# Patient Record
Sex: FEMALE | Race: BLACK OR AFRICAN AMERICAN
[De-identification: names, ages, dates, MRNs, and addresses within clinical notes are randomized per-mention and may not be internally consistent; named-entity substitution may affect disease eponyms.]

---

## 2023-02-13 PROBLEM — F32.A DEPRESSION: Status: ACTIVE | Noted: 2023-02-13

## 2023-02-13 PROBLEM — E66.9 CLASS 2 OBESITY WITH BODY MASS INDEX (BMI) OF 38.0 TO 38.9 IN ADULT: Status: ACTIVE | Noted: 2023-02-13

## 2023-02-13 PROBLEM — R61 NIGHT SWEATS: Status: ACTIVE | Noted: 2023-02-13

## 2023-02-13 PROBLEM — J45.909 ASTHMA (HHS-HCC): Status: ACTIVE | Noted: 2023-02-13

## 2023-02-13 PROBLEM — E55.9 VITAMIN D DEFICIENCY: Status: ACTIVE | Noted: 2023-02-13

## 2023-02-13 PROBLEM — M79.89 LEG SWELLING: Status: ACTIVE | Noted: 2023-02-13

## 2023-02-13 PROBLEM — R51.9 FACE PAIN: Status: ACTIVE | Noted: 2023-02-13

## 2023-02-13 PROBLEM — R51.9 HEADACHE: Status: ACTIVE | Noted: 2023-02-13

## 2023-02-13 PROBLEM — T75.3XXA MOTION SICKNESS: Status: ACTIVE | Noted: 2023-02-13

## 2023-02-13 PROBLEM — R60.9 LIPOEDEMA: Status: ACTIVE | Noted: 2023-02-13

## 2023-02-13 PROBLEM — G50.0 LEFT-SIDED TRIGEMINAL NEURALGIA: Status: ACTIVE | Noted: 2023-02-13

## 2023-02-13 PROBLEM — Z04.9 CONDITION NOT FOUND: Status: ACTIVE | Noted: 2023-02-13

## 2023-02-13 PROBLEM — I10 BENIGN HYPERTENSION: Status: ACTIVE | Noted: 2023-02-13

## 2023-02-13 PROBLEM — E88.810 DYSMETABOLIC SYNDROME: Status: ACTIVE | Noted: 2023-02-13

## 2023-02-13 PROBLEM — E66.812 CLASS 2 OBESITY WITH BODY MASS INDEX (BMI) OF 38.0 TO 38.9 IN ADULT: Status: ACTIVE | Noted: 2023-02-13

## 2023-02-13 PROBLEM — R06.83 SNORING: Status: ACTIVE | Noted: 2023-02-13

## 2023-02-13 PROBLEM — E78.5 LIPIDEMIA: Status: ACTIVE | Noted: 2023-02-13

## 2023-02-13 PROBLEM — G47.30 SLEEP APNEA, UNSPECIFIED: Status: ACTIVE | Noted: 2023-02-13

## 2023-02-13 RX ORDER — VIT C/E/ZN/COPPR/LUTEIN/ZEAXAN 250MG-90MG
CAPSULE ORAL DAILY
COMMUNITY

## 2023-02-13 RX ORDER — ALBUTEROL SULFATE 0.83 MG/ML
SOLUTION RESPIRATORY (INHALATION)
COMMUNITY

## 2023-02-13 RX ORDER — CLOBETASOL PROPIONATE 0.5 MG/G
1 OINTMENT TOPICAL EVERY OTHER DAY
COMMUNITY

## 2023-02-13 RX ORDER — AMLODIPINE BESYLATE 5 MG/1
1 TABLET ORAL DAILY
Qty: 30 TABLET | Refills: 14 | COMMUNITY
End: 2023-09-01

## 2023-02-13 RX ORDER — CLINDAMYCIN PHOSPHATE 10 UG/ML
1 LOTION TOPICAL DAILY
COMMUNITY
End: 2023-09-01

## 2023-02-13 RX ORDER — ALBUTEROL SULFATE 90 UG/1
1 AEROSOL, METERED RESPIRATORY (INHALATION) EVERY 4 HOURS PRN
COMMUNITY

## 2023-02-13 RX ORDER — BENZOYL PEROXIDE 100 MG/ML
LIQUID TOPICAL DAILY PRN
COMMUNITY

## 2023-02-13 RX ORDER — LOSARTAN POTASSIUM 50 MG/1
50 TABLET ORAL
COMMUNITY
End: 2023-04-13

## 2023-04-13 DIAGNOSIS — I10 PRIMARY HYPERTENSION: Primary | ICD-10-CM

## 2023-04-13 RX ORDER — LOSARTAN POTASSIUM 50 MG/1
50 TABLET ORAL DAILY
Qty: 30 TABLET | Refills: 0 | Status: SHIPPED | OUTPATIENT
Start: 2023-04-13 | End: 2023-09-01 | Stop reason: SDUPTHER

## 2023-07-09 ENCOUNTER — NURSE TRIAGE (OUTPATIENT)
Dept: OTHER | Facility: CLINIC | Age: 58
End: 2023-07-09

## 2023-07-31 ENCOUNTER — TELEPHONE (OUTPATIENT)
Dept: PRIMARY CARE | Facility: CLINIC | Age: 58
End: 2023-07-31

## 2023-07-31 NOTE — TELEPHONE ENCOUNTER
Patient stated she has had a cough and inflammation of the chest for a month and she is concerned she has an appointment in November but wants to be seen sooner.

## 2023-09-01 ENCOUNTER — OFFICE VISIT (OUTPATIENT)
Dept: PRIMARY CARE | Facility: CLINIC | Age: 58
End: 2023-09-01
Payer: COMMERCIAL

## 2023-09-01 VITALS
OXYGEN SATURATION: 93 % | BODY MASS INDEX: 41.7 KG/M2 | HEART RATE: 77 BPM | SYSTOLIC BLOOD PRESSURE: 156 MMHG | WEIGHT: 250.6 LBS | RESPIRATION RATE: 16 BRPM | DIASTOLIC BLOOD PRESSURE: 85 MMHG

## 2023-09-01 DIAGNOSIS — Z13.1 SCREENING FOR DIABETES MELLITUS: ICD-10-CM

## 2023-09-01 DIAGNOSIS — J40 BRONCHITIS: ICD-10-CM

## 2023-09-01 DIAGNOSIS — G47.30 SLEEP APNEA, UNSPECIFIED TYPE: ICD-10-CM

## 2023-09-01 DIAGNOSIS — N28.9 FUNCTION KIDNEY DECREASED: ICD-10-CM

## 2023-09-01 DIAGNOSIS — I10 PRIMARY HYPERTENSION: ICD-10-CM

## 2023-09-01 DIAGNOSIS — E66.01 SEVERE OBESITY (BMI >= 40) (MULTI): ICD-10-CM

## 2023-09-01 DIAGNOSIS — I10 BENIGN HYPERTENSION: Primary | ICD-10-CM

## 2023-09-01 LAB
ESTIMATED AVERAGE GLUCOSE FOR HBA1C: 120 MG/DL
HEMOGLOBIN A1C/HEMOGLOBIN TOTAL IN BLOOD: 5.8 %

## 2023-09-01 PROCEDURE — 3079F DIAST BP 80-89 MM HG: CPT | Performed by: SPECIALIST

## 2023-09-01 PROCEDURE — 99214 OFFICE O/P EST MOD 30 MIN: CPT | Performed by: SPECIALIST

## 2023-09-01 PROCEDURE — 3077F SYST BP >= 140 MM HG: CPT | Performed by: SPECIALIST

## 2023-09-01 PROCEDURE — 1036F TOBACCO NON-USER: CPT | Performed by: SPECIALIST

## 2023-09-01 PROCEDURE — 83036 HEMOGLOBIN GLYCOSYLATED A1C: CPT

## 2023-09-01 PROCEDURE — 3008F BODY MASS INDEX DOCD: CPT | Performed by: SPECIALIST

## 2023-09-01 RX ORDER — LOSARTAN POTASSIUM 50 MG/1
50 TABLET ORAL DAILY
Qty: 90 TABLET | Refills: 1 | Status: SHIPPED | OUTPATIENT
Start: 2023-09-01 | End: 2024-04-17 | Stop reason: SDUPTHER

## 2023-09-01 RX ORDER — AMLODIPINE BESYLATE 5 MG/1
5 TABLET ORAL DAILY
Qty: 90 TABLET | Refills: 1 | Status: SHIPPED | OUTPATIENT
Start: 2023-09-01 | End: 2023-11-09

## 2023-09-01 RX ORDER — LOSARTAN POTASSIUM 50 MG/1
50 TABLET ORAL DAILY
Qty: 90 TABLET | Refills: 1 | Status: SHIPPED | OUTPATIENT
Start: 2023-09-01 | End: 2023-09-01

## 2023-09-01 NOTE — PROGRESS NOTES
Subjective   Patient ID: Sheela Davis is a 57 y.o. female who presents for Cough (Persistent. Went to ER the other day and they diagnosed her with bronchitis.), Medication Question (Patient wants to see if she possibly can get a Rx for Ozempic, Trulicity and Mounjaro.), and Hypertension.  HPI    56 yo female retired  Pmhx Asthma, Hypertension, Lipoedema, Hiradenitis Supparativa Obesity BMI 37 presents for follow-up     Late June, visited aunt, got sick end of June got better, but kept getting sick  Had been sick for so long, had been letharigic and laying around but is starting to feel better  Went to ER 8/30 and is on doxycycline    Weight is creeping up   Previously saw weight management doctor, said she needed Ozempic but was not covered and told her to find out from insurnace company which one would be covered and she said she was told Ozempic Trulicity and Munjaro are covered.  Said provider then ordered Trulicity because patient was told it would be covered but then needed prior auth (sent message to her prescriber to see if prior auth was done).  Discussed with patient that most insurance companies will only cover those medications if there is a diagnosis of diabetes.  She wants to get HBA1C tested.    By the time she did all of that she couldn't get another appointment with Weight Management.      Has been taking prednisone, she prescribed it for herself.  She used an antibiotic ointment and had an allergic reaction so had hives all over and had prednisone at home from dermatology for the Hiradenitis Suppurativa, has been taking for 7 days and took 40 mg x 4 now down to 20 mg x 2 and will take 1 for 2 days     Allergies   Allergen Reactions    Clindamycin Hives      Current Outpatient Medications   Medication Instructions    albuterol (ProAir HFA) 90 mcg/actuation inhaler 1 puff, inhalation, Every 4 hours PRN    albuterol 2.5 mg /3 mL (0.083 %) nebulizer solution inhalation, Give x 1    amLODIPine  (NORVASC) 5 mg, oral, Daily    benzoyl peroxide (Benzac AC) 10 % external wash Topical, Daily PRN    cholecalciferol (Vitamin D-3) 25 MCG (1000 UT) capsule oral, Daily    clobetasol (Temovate) 0.05 % ointment 1 Application, Topical, Every other day, Apply to scalp.    diclofenac sodium 1 % kit 1 Application, transdermal, 4 times daily, Apply 4 GM as needed. Do not apply more than 16 GM daily to any one affected joint    losartan (COZAAR) 50 mg, oral, Daily        Review of Systems  Constitutional  Some fatigue, no fevers, no chills, no unintentional weight loss,   HEENT:  No headaches, no dizziness, no blurred vision  Cardiovascular:  No chest pain, no palpitations now   Respiratory:  Positive cough x month and wheezing, Nocturnal Cough but improving with antibiotic No shortness of breath at rest  GI:  No nausea, no vomiting, no diarrhea  :  No burning with urination some increased frequency but drinking more    Physical Exam  /85   Pulse 77   Resp 16   Wt 114 kg (250 lb 9.6 oz)   SpO2 93%   BMI 41.70 kg/m²   152/68   General:    Well-appearing  F in no acute distress, well nourished, well hydrated  Head:  Normocephalic, atraumatic  Skin:          Warm dry,   Eyes:  Anicteric sclera, pupils equal,   Oral:      Not examined due to pandemic  Neck:   Supple  Cor:      Regular rate, normal S1, S2, no murmurs appreciated, no S3, no S4   Lungs:   Clear to auscultation b/l, exp wheezes, no rhonchi, no crackles, no accessory respiratory muscle use    Assessment/Plan   Problem List Items Addressed This Visit       Sleep apnea, unspecified     On Cpap         Severe obesity (BMI >= 40) (CMS/[facility])     Previous evaluation with [facility], was prescribed Trulicity but said she needs prior auth  Is frustrated and wants to lose weight  She believes Ozempic or Trulicity should be covered for her because of Htn, LISA  Discussed, most insurance does not cover these medications for weight loss  medications and only cover if diabetes  Labs ordered HBA1C, she will follow-up with her insurance carrier to clarify coverage           Bronchitis     On doxycycline from ER x 2 days (to complete 10 day course)  She is also taking prednisone that she had at home          Benign hypertension - Primary     Suboptimally controlled, but has been off of losartan x 2 weeks  Will re-order losartan 50 mg daily wants 90 day supply  Currently taking 5 mg amlodipine          Relevant Orders    Basic metabolic panel    Function kidney decreased     Gfr in ER was 55, cr 1.14  Has not been on losartan x 2 weeks  Labs ordered BMP to do in few weeks after resuming losartan         Relevant Orders    Basic metabolic panel    Screening for diabetes mellitus     Labs ordered hba1c         Relevant Orders    Hemoglobin A1C (Completed)       Needs follow-up blood pressure in 2-4 weeks       Nina Clifton, DO

## 2023-09-05 PROBLEM — Z13.1 SCREENING FOR DIABETES MELLITUS: Status: ACTIVE | Noted: 2023-09-05

## 2023-09-05 PROBLEM — E66.01 SEVERE OBESITY (BMI >= 40) (MULTI): Status: ACTIVE | Noted: 2023-09-05

## 2023-09-05 PROBLEM — E66.9 CLASS 2 OBESITY WITH BODY MASS INDEX (BMI) OF 38.0 TO 38.9 IN ADULT: Status: RESOLVED | Noted: 2023-02-13 | Resolved: 2023-09-05

## 2023-09-05 PROBLEM — N28.9 FUNCTION KIDNEY DECREASED: Status: ACTIVE | Noted: 2023-09-05

## 2023-09-05 PROBLEM — J40 BRONCHITIS: Status: ACTIVE | Noted: 2023-09-05

## 2023-09-05 PROBLEM — I10 BENIGN HYPERTENSION: Status: ACTIVE | Noted: 2023-09-05

## 2023-09-05 PROBLEM — E66.812 CLASS 2 OBESITY WITH BODY MASS INDEX (BMI) OF 38.0 TO 38.9 IN ADULT: Status: RESOLVED | Noted: 2023-02-13 | Resolved: 2023-09-05

## 2023-09-05 PROBLEM — I10 PRIMARY HYPERTENSION: Status: ACTIVE | Noted: 2023-09-05

## 2023-09-05 NOTE — ASSESSMENT & PLAN NOTE
Suboptimally controlled, but has been off of losartan x 2 weeks  Will re-order losartan 50 mg daily wants 90 day supply  Currently taking 5 mg amlodipine

## 2023-09-05 NOTE — ASSESSMENT & PLAN NOTE
Suboptimally controlled   But has been off of losartan x 2 weeks  Will re-order losartan 50 mg daily wants 90 day supply  Currently taking 5 mg amlodipine

## 2023-09-05 NOTE — ASSESSMENT & PLAN NOTE
Gfr in ER was 55, cr 1.14  Has not been on losartan x 2 weeks  Labs ordered BMP to do in few weeks after resuming losartan

## 2023-09-05 NOTE — ASSESSMENT & PLAN NOTE
Previous evaluation with [facility], was prescribed Trulicity but said she needs prior auth  Is frustrated and wants to lose weight  She believes Ozempic or Trulicity should be covered for her because of Htn, LISA  Discussed, most insurance does not cover these medications for weight loss medications and only cover if diabetes  Labs ordered HBA1C, she will follow-up with her insurance carrier to clarify coverage

## 2023-09-05 NOTE — ASSESSMENT & PLAN NOTE
On doxycycline from ER x 2 days (to complete 10 day course)  She is also taking prednisone that she had at home

## 2023-09-07 DIAGNOSIS — R73.03 PREDIABETES: Primary | ICD-10-CM

## 2023-09-07 RX ORDER — METFORMIN HYDROCHLORIDE 500 MG/1
500 TABLET ORAL
Qty: 90 TABLET | Refills: 0 | Status: SHIPPED | OUTPATIENT
Start: 2023-09-07 | End: 2024-04-28 | Stop reason: SDUPTHER

## 2023-10-02 ENCOUNTER — OFFICE VISIT (OUTPATIENT)
Dept: PRIMARY CARE | Facility: CLINIC | Age: 58
End: 2023-10-02
Payer: COMMERCIAL

## 2023-10-02 VITALS
BODY MASS INDEX: 41.44 KG/M2 | DIASTOLIC BLOOD PRESSURE: 68 MMHG | WEIGHT: 249 LBS | HEART RATE: 83 BPM | SYSTOLIC BLOOD PRESSURE: 128 MMHG | RESPIRATION RATE: 16 BRPM | OXYGEN SATURATION: 92 %

## 2023-10-02 DIAGNOSIS — R73.03 PREDIABETES: ICD-10-CM

## 2023-10-02 DIAGNOSIS — N28.9 FUNCTION KIDNEY DECREASED: ICD-10-CM

## 2023-10-02 DIAGNOSIS — I10 BENIGN HYPERTENSION: Primary | ICD-10-CM

## 2023-10-02 DIAGNOSIS — Z00.00 HEALTHCARE MAINTENANCE: ICD-10-CM

## 2023-10-02 DIAGNOSIS — Z12.31 ENCOUNTER FOR SCREENING MAMMOGRAM FOR MALIGNANT NEOPLASM OF BREAST: ICD-10-CM

## 2023-10-02 LAB
ANION GAP SERPL CALC-SCNC: 12 MMOL/L (ref 10–20)
BUN SERPL-MCNC: 16 MG/DL (ref 6–23)
CALCIUM SERPL-MCNC: 9.6 MG/DL (ref 8.6–10.6)
CHLORIDE SERPL-SCNC: 104 MMOL/L (ref 98–107)
CO2 SERPL-SCNC: 29 MMOL/L (ref 21–32)
CREAT SERPL-MCNC: 0.91 MG/DL (ref 0.5–1.05)
GFR SERPL CREATININE-BSD FRML MDRD: 72 ML/MIN/1.73M*2
GLUCOSE SERPL-MCNC: 97 MG/DL (ref 74–99)
POTASSIUM SERPL-SCNC: 4.7 MMOL/L (ref 3.5–5.3)
SODIUM SERPL-SCNC: 140 MMOL/L (ref 136–145)

## 2023-10-02 PROCEDURE — 3078F DIAST BP <80 MM HG: CPT | Performed by: SPECIALIST

## 2023-10-02 PROCEDURE — 3008F BODY MASS INDEX DOCD: CPT | Performed by: SPECIALIST

## 2023-10-02 PROCEDURE — 1036F TOBACCO NON-USER: CPT | Performed by: SPECIALIST

## 2023-10-02 PROCEDURE — 3074F SYST BP LT 130 MM HG: CPT | Performed by: SPECIALIST

## 2023-10-02 PROCEDURE — 36415 COLL VENOUS BLD VENIPUNCTURE: CPT

## 2023-10-02 PROCEDURE — 99214 OFFICE O/P EST MOD 30 MIN: CPT | Performed by: SPECIALIST

## 2023-10-02 RX ORDER — CICLOPIROX 1 G/100ML
SHAMPOO TOPICAL
COMMUNITY
Start: 2005-09-13

## 2023-10-02 NOTE — PROGRESS NOTES
Subjective   Patient ID: Sheela Davis is a 58 y.o. female who presents for Follow-up (BP check.) and Knee Pain (Left knee pain.).  HPI    56 yo female retired  Pmhx Asthma, Hypertension, Lipoedema, Hiradenitis Supparativa Obesity BMI 41 presents for follow-up and c/o Left knee pain    Allergies   Allergen Reactions    Clindamycin Hives      Current Outpatient Medications   Medication Instructions    albuterol (ProAir HFA) 90 mcg/actuation inhaler 1 puff, inhalation, Every 4 hours PRN    albuterol 2.5 mg /3 mL (0.083 %) nebulizer solution inhalation, Give x 1    amLODIPine (NORVASC) 5 mg, oral, Daily    benzoyl peroxide (Benzac AC) 10 % external wash Topical, Daily PRN    cholecalciferol (Vitamin D-3) 25 MCG (1000 UT) capsule oral, Daily    ciclopirox (Loprox) 1 % shampoo Topical    clobetasol (Temovate) 0.05 % ointment 1 Application, Topical, Every other day, Apply to scalp.    diclofenac sodium 1 % kit 1 Application, transdermal, 4 times daily, Apply 4 GM as needed. Do not apply more than 16 GM daily to any one affected joint    losartan (COZAAR) 50 mg, oral, Daily    metFORMIN (GLUCOPHAGE) 500 mg, oral, Daily with breakfast        Review of Systems  Constitutional  No fatigue, no unintentional weight loss,   HEENT:  No headaches, no dizziness  Cardiovascular:  No chest pain, no palpitations,   Respiratory:  No cough, No shortness of breath  GI:  No constipation no nausea     Physical Exam  /68   Pulse 83   Resp 16   Wt 113 kg (249 lb)   SpO2 92%   BMI 41.44 kg/m²   132/60 on left  General:    Well-appearing  F in no acute distress, well nourished, well hydrated  Head:  Normocephalic, atraumatic  Skin:          Warm dry,   Eyes:  Anicteric sclera, pupils equal,   Oral:      Not examined due to pandemic  Neck:   Supple,  Cor:      Regular rate, normal S1, S2, no murmurs appreciated, no S3, no S4   Lungs:   Clear to auscultation b/l, no wheezes, no rhonchi, no crackles, no accessory respiratory  muscle use  Ext:            Wearing compression hose today     Assessment/Plan   Problem List Items Addressed This Visit       Benign hypertension - Primary     Controlled on current medication  BMP was not done by lab, will do now         Function kidney decreased     To Get BMP         Prediabetes     HBA1C 5.8  Exercising and eating right   On metformin 500 mg daily         Healthcare maintenance     Declined annual influenza vaccine  Recommended Covid vaccine  Recommended RSV vaccine         Encounter for screening mammogram for malignant neoplasm of breast    Relevant Orders    BI mammo bilateral screening tomosynthesis          Nina Clifton, DO

## 2023-10-09 PROBLEM — Z28.21 INFLUENZA VACCINATION DECLINED BY PATIENT: Status: ACTIVE | Noted: 2023-10-09

## 2023-10-09 PROBLEM — Z00.00 HEALTHCARE MAINTENANCE: Status: ACTIVE | Noted: 2023-10-09

## 2023-10-09 PROBLEM — R73.03 PREDIABETES: Status: ACTIVE | Noted: 2023-10-09

## 2023-10-09 PROBLEM — Z12.31 ENCOUNTER FOR SCREENING MAMMOGRAM FOR MALIGNANT NEOPLASM OF BREAST: Status: ACTIVE | Noted: 2023-10-09

## 2023-11-08 DIAGNOSIS — I10 PRIMARY HYPERTENSION: ICD-10-CM

## 2023-11-09 ENCOUNTER — ANCILLARY PROCEDURE (OUTPATIENT)
Dept: RADIOLOGY | Facility: CLINIC | Age: 58
End: 2023-11-09
Payer: COMMERCIAL

## 2023-11-09 DIAGNOSIS — Z12.31 ENCOUNTER FOR SCREENING MAMMOGRAM FOR MALIGNANT NEOPLASM OF BREAST: ICD-10-CM

## 2023-11-09 PROCEDURE — 77063 BREAST TOMOSYNTHESIS BI: CPT

## 2023-11-09 PROCEDURE — 77063 BREAST TOMOSYNTHESIS BI: CPT | Mod: BILATERAL PROCEDURE | Performed by: RADIOLOGY

## 2023-11-09 PROCEDURE — 77067 SCR MAMMO BI INCL CAD: CPT | Mod: BILATERAL PROCEDURE | Performed by: RADIOLOGY

## 2023-11-09 RX ORDER — AMLODIPINE BESYLATE 5 MG/1
5 TABLET ORAL DAILY
Qty: 90 TABLET | Refills: 0 | Status: SHIPPED | OUTPATIENT
Start: 2023-11-09 | End: 2024-04-17 | Stop reason: SDUPTHER

## 2023-11-13 ENCOUNTER — TELEPHONE (OUTPATIENT)
Dept: PRIMARY CARE | Facility: CLINIC | Age: 58
End: 2023-11-13

## 2023-11-13 DIAGNOSIS — M25.562 LEFT KNEE PAIN, UNSPECIFIED CHRONICITY: Primary | ICD-10-CM

## 2023-11-13 NOTE — TELEPHONE ENCOUNTER
She says no fall, nothing happened. It starting hurting the beginning of the summer and never went away.

## 2023-11-15 ENCOUNTER — ANCILLARY PROCEDURE (OUTPATIENT)
Dept: RADIOLOGY | Facility: CLINIC | Age: 58
End: 2023-11-15
Payer: COMMERCIAL

## 2023-11-15 DIAGNOSIS — M25.562 LEFT KNEE PAIN, UNSPECIFIED CHRONICITY: ICD-10-CM

## 2023-11-15 PROCEDURE — 73562 X-RAY EXAM OF KNEE 3: CPT | Mod: LT

## 2023-11-15 PROCEDURE — 73562 X-RAY EXAM OF KNEE 3: CPT | Mod: LEFT SIDE | Performed by: RADIOLOGY

## 2023-11-17 ENCOUNTER — APPOINTMENT (OUTPATIENT)
Dept: PRIMARY CARE | Facility: CLINIC | Age: 58
End: 2023-11-17

## 2023-11-28 DIAGNOSIS — M25.569 KNEE PAIN, UNSPECIFIED CHRONICITY, UNSPECIFIED LATERALITY: Primary | ICD-10-CM

## 2023-12-21 ENCOUNTER — OFFICE VISIT (OUTPATIENT)
Dept: ORTHOPEDIC SURGERY | Facility: CLINIC | Age: 58
End: 2023-12-21
Payer: COMMERCIAL

## 2023-12-21 DIAGNOSIS — M17.12 PRIMARY OSTEOARTHRITIS OF LEFT KNEE: Primary | ICD-10-CM

## 2023-12-21 DIAGNOSIS — M25.569 KNEE PAIN, UNSPECIFIED CHRONICITY, UNSPECIFIED LATERALITY: ICD-10-CM

## 2023-12-21 PROCEDURE — 3008F BODY MASS INDEX DOCD: CPT | Performed by: ORTHOPAEDIC SURGERY

## 2023-12-21 PROCEDURE — 99203 OFFICE O/P NEW LOW 30 MIN: CPT | Performed by: ORTHOPAEDIC SURGERY

## 2023-12-21 PROCEDURE — 1036F TOBACCO NON-USER: CPT | Performed by: ORTHOPAEDIC SURGERY

## 2023-12-21 PROCEDURE — 99213 OFFICE O/P EST LOW 20 MIN: CPT | Performed by: ORTHOPAEDIC SURGERY

## 2023-12-21 ASSESSMENT — PAIN SCALES - GENERAL: PAINLEVEL_OUTOF10: 4

## 2023-12-21 ASSESSMENT — PAIN - FUNCTIONAL ASSESSMENT: PAIN_FUNCTIONAL_ASSESSMENT: 0-10

## 2023-12-22 NOTE — PROGRESS NOTES
Patient is a 58-year-old female presents today for evaluation of left knee pain that has been going on for some time.  She does take Advil.  She is a history of lymphedema.  She has not had any injections.  She never had surgery on the knee.    Left knee:  AAOx3, NAD, walks with a mild antalgic gait  Varus allignment  Range of motion 0-110 stable to varus/valgus/anterior/posterior stress through out the range of motion  Slight laxity with varus stress  Mild medial  joint line tenderness to palpation  Mild effusion  SILT in a lela/saph/per/tib distribution  5/5 knee extension/df/pf/ehl  ½ dorsalis pedis and posterior tibial pulse  no popliteal lymphadenopathy  no other overlying lesions  mood: euthymic  Respirations non labored    Plain films were reviewed by myself in clinic today.  She has mild osteoarthritis of her left knee otherwise negative for osseous or soft tissue abnormality.    We discussed the treatment of mild arthritis today in clinic.  We discussed anti-inflammatory use, activity modification, weight loss and injections.  She declined proceeding with an injection today in clinic.  She can continue use over-the-counter anti-inflammatories as needed.  She does not require anything surgical at this time.  All of her questions were answered.    This note was created using voice recognition software and was not corrected for typographical or grammatical errors.

## 2024-01-24 ENCOUNTER — OFFICE VISIT (OUTPATIENT)
Dept: PRIMARY CARE | Facility: CLINIC | Age: 59
End: 2024-01-24
Payer: COMMERCIAL

## 2024-01-24 VITALS
HEIGHT: 65 IN | OXYGEN SATURATION: 97 % | SYSTOLIC BLOOD PRESSURE: 115 MMHG | BODY MASS INDEX: 40.15 KG/M2 | DIASTOLIC BLOOD PRESSURE: 76 MMHG | HEART RATE: 85 BPM | WEIGHT: 241 LBS

## 2024-01-24 DIAGNOSIS — Z11.59 ENCOUNTER FOR SCREENING FOR OTHER VIRAL DISEASES: ICD-10-CM

## 2024-01-24 DIAGNOSIS — Z12.4 SCREENING FOR CERVICAL CANCER: ICD-10-CM

## 2024-01-24 DIAGNOSIS — R60.9 LIPOEDEMA: ICD-10-CM

## 2024-01-24 DIAGNOSIS — J45.20 MILD INTERMITTENT ASTHMA WITHOUT COMPLICATION (HHS-HCC): ICD-10-CM

## 2024-01-24 DIAGNOSIS — R07.89 ATYPICAL CHEST PAIN: ICD-10-CM

## 2024-01-24 DIAGNOSIS — Z00.00 ANNUAL PHYSICAL EXAM: Primary | ICD-10-CM

## 2024-01-24 DIAGNOSIS — E66.01 SEVERE OBESITY (BMI >= 40) (MULTI): ICD-10-CM

## 2024-01-24 DIAGNOSIS — G47.33 OSA (OBSTRUCTIVE SLEEP APNEA): ICD-10-CM

## 2024-01-24 DIAGNOSIS — Z12.11 SCREEN FOR COLON CANCER: ICD-10-CM

## 2024-01-24 DIAGNOSIS — R73.03 PREDIABETES: ICD-10-CM

## 2024-01-24 DIAGNOSIS — I10 BENIGN HYPERTENSION: ICD-10-CM

## 2024-01-24 PROCEDURE — 99396 PREV VISIT EST AGE 40-64: CPT | Performed by: SPECIALIST

## 2024-01-24 PROCEDURE — 1036F TOBACCO NON-USER: CPT | Performed by: SPECIALIST

## 2024-01-24 PROCEDURE — 3074F SYST BP LT 130 MM HG: CPT | Performed by: SPECIALIST

## 2024-01-24 PROCEDURE — 3008F BODY MASS INDEX DOCD: CPT | Performed by: SPECIALIST

## 2024-01-24 PROCEDURE — 3078F DIAST BP <80 MM HG: CPT | Performed by: SPECIALIST

## 2024-01-24 PROCEDURE — 93000 ELECTROCARDIOGRAM COMPLETE: CPT | Performed by: SPECIALIST

## 2024-01-24 NOTE — PATIENT INSTRUCTIONS
Recommend Covid vaccine   Recommend RSV vaccine (separate from other vaccines by 2 weeks)  Recommend Tdap   Recommend second Shingrix

## 2024-01-24 NOTE — PROGRESS NOTES
Subjective   Patient ID: Sheela Davis is a 58 y.o. female who presents for No chief complaint on file..  HPI  57 yo female retired  working at [employer] Asthma, Hypertension, Lipoedema, Hiradenitis Supparativa Obesity BMI 41, LISA (not using machine) presents for CPE    Working toward weight loss (249# down to 241# today)    She stopped metformin around the holidays  Had to take phone call from work  Brought sample letters done before regarding lipoedema    Saw private provider Dr. Khris Glez Plastic Surgeon and his nurse told her to take deep breaths.  Getting Wet-assisted edema liposuction for known chronic lipoedema to help slow progression and alleviate pain.  Also, helps with joint pain since less weight.  Insurance needs letter so that they understand procedure is not cosmetic in nature.      Using rescue inhaler only as needed no increased use.      Episode lasted 1.5 weeks, occurred 2 wks ago, felt like reflex, stomach felt like something was stuck in upper esophagus, was not burning, just uncomfortable, not pressure, and felt bloated.   Took a laxative did not help but then resolved on its own.  Had been drinking alcohol from holidays but no other trigger foods.  No associated shortness of breath or radiation.  Discussed EGD if symptoms recur.  Will take pepcid if needed and let me know if symptoms recur.      Allergies   Allergen Reactions    Clindamycin Hives      Current Outpatient Medications   Medication Instructions    albuterol (ProAir HFA) 90 mcg/actuation inhaler 1 puff, inhalation, Every 4 hours PRN    albuterol 2.5 mg /3 mL (0.083 %) nebulizer solution inhalation, Give x 1    amLODIPine (NORVASC) 5 mg, oral, Daily    benzoyl peroxide (Benzac AC) 10 % external wash Topical, Daily PRN    cholecalciferol (Vitamin D-3) 25 MCG (1000 UT) capsule oral, Daily    ciclopirox (Loprox) 1 % shampoo Topical    clobetasol (Temovate) 0.05 % ointment 1 Application, Topical, Every other day, Apply  "to scalp.    diclofenac sodium 1 % kit 1 Application, transdermal, 4 times daily, Apply 4 GM as needed. Do not apply more than 16 GM daily to any one affected joint    losartan (COZAAR) 50 mg, oral, Daily    metFORMIN (GLUCOPHAGE) 500 mg, oral, Daily with breakfast        Review of Systems  Constitutional  No fatigue, no fevers, no chills, no unintentional weight loss,   HEENT:  No headaches, no dizziness, eye exams current, no double vision, no blurred vision, no hearing loss  Cardiovascular:  No chest pain, no palpitations, no shortness of breath with exertion (one flight of stairs),   Respiratory:  No cough, no hemoptysis, no wheezing, No shortness of breath at rest  GI:  No dysphagia, no odynophagia,  Episode x 1.5 weeks after consuming alcohol about 2 weeks ago result resolved now, no abdominal pain, no nausea, no vomiting, no changes in bowel habits, no bright red blood per rectum, no melena  :  No urinary frequency, no dysuria, no urine incontinence  MSK:  No falls, no joint pain, no joint swelling  Neuro:  No tremors, no extremity weakness, no changes in sensation  Breasts:  No abnormalities on self breast exam no nipple discharge no skin changes    Physical Exam  /76   Pulse 85   Ht 1.651 m (5' 5\")   Wt 109 kg (241 lb)   LMP  (LMP Unknown)   SpO2 97%   BMI 40.10 kg/m²   122/60 right arm seated large cuff  General:    Well-appearing  F in no acute distress, well nourished, well hydrated  Head:  Normocephalic, atraumatic  Skin:          Warm dry,   Eyes:  Anicteric sclera, pupils equal,   Ears:        TMs intact  Oral:      Not examined due to pandemic  Neck:   Supple, no cervical/supraclavicular adenopathy, no thyromegaly or nodules appreciated on exam  Cor:      Regular rate, normal S1, S2, no murmurs appreciated, no S3, no S4   Lungs:   Clear to auscultation b/l, no wheezes, no rhonchi, no crackles, no accessory respiratory muscle use  Abd:          Soft, nontender, no guarding, no rebound, " "no hepatosplenomegaly appreciated   Ext:            No lower extremity edema, no palpable cords  Pulses:      Pedal pulses intact  Neuro:   CN2-12 grossly intact   EKG:                 Sinus arrhythmia at rate of 66, No ST elevations/depressions, EKG essentially unchanged when compared to 2/7/17 EKG    Assessment/Plan   Problem List Items Addressed This Visit       Lipoedema     Hx of lipoedema (fat deposition) with previous surgical procedures in [place]   Saw Plastic Surgery, wet-assisted edema liposuction was recommended to alleviate pain and slow progression  Will need letter         Severe obesity (BMI >= 40) (CMS/HCC)     Weight up from 234# 8/2022         Benign hypertension     -well controlled current medication         Prediabetes     -HBA1C 5.8 (9/2023)  -Stopped Metformin 500 mg around holidays, will order Labs HBA1C         Relevant Orders    Hemoglobin A1C    Annual physical exam - Primary     -Declines annual influenza vaccine, although recommended  -Previously received Covid vaccines 5/10/2021, 6/2/2021, 12/8/2021, 8/4/2022, Recommend Covid vaccine, she plans to get  -Recommend RSV vaccine, plans  -Prior Tdap 5/14/2010, recommend, plans  -Previously received first Shingrix vaccine 8/4/2022, recommend second  -Previously received PPSV23 12/8/2021, recommend Prevnar 20 at age 65  -Mammogram 11/9/2023, repeat 1 yr  -Colonoscopy 10/7/2014 Dr. Casey, repeat 10 yrs  -Recommend annual gynecology exam and pap if needed  -Labs ordered Hep C Antibody, CMP CBC Fx Lipids         Relevant Orders    Comprehensive Metabolic Panel    CBC    Lipid Panel    Encounter for screening for other viral diseases     Ordered screen Hep C Antibody         Relevant Orders    Hepatitis C Antibody    LISA (obstructive sleep apnea)     Not using machine         Atypical chest pain     -Per patient, was not really pain but \"uncomfortable\" thought indigestion  -EKG today given age and Htn         Relevant Orders    ECG 12 lead " (Clinic Performed)    Screening for cervical cancer    Relevant Orders    Referral to Gynecology    Screen for colon cancer     Colonoscopy 10/7/2014 with Dr. Casey, repeat 10 yrs  Ordered colonosocpy         Relevant Orders    Colonoscopy Screening; Average Risk Patient    Mild intermittent asthma without complication     -Uses Proair   -Prior PPSV23 12/8/2021  -Recommend Tdap (last done 5/2010)             Nina Clifton, DO

## 2024-01-30 PROBLEM — G47.33 OSA (OBSTRUCTIVE SLEEP APNEA): Status: ACTIVE | Noted: 2024-01-30

## 2024-01-30 PROBLEM — Z12.4 SCREENING FOR CERVICAL CANCER: Status: ACTIVE | Noted: 2024-01-30

## 2024-01-30 PROBLEM — R07.89 ATYPICAL CHEST PAIN: Status: ACTIVE | Noted: 2024-01-30

## 2024-01-30 PROBLEM — Z12.11 SCREEN FOR COLON CANCER: Status: ACTIVE | Noted: 2024-01-30

## 2024-01-30 PROBLEM — E78.5 LIPIDEMIA: Status: RESOLVED | Noted: 2023-02-13 | Resolved: 2024-01-30

## 2024-01-30 PROBLEM — Z11.59 ENCOUNTER FOR SCREENING FOR OTHER VIRAL DISEASES: Status: ACTIVE | Noted: 2024-01-30

## 2024-01-30 PROBLEM — J45.20 MILD INTERMITTENT ASTHMA WITHOUT COMPLICATION (HHS-HCC): Status: ACTIVE | Noted: 2024-01-30

## 2024-01-30 NOTE — ASSESSMENT & PLAN NOTE
Hx of lipoedema (fat deposition) with previous surgical procedures in [place]   Saw Plastic Surgery, wet-assisted edema liposuction was recommended to alleviate pain and slow progression  Will need letter

## 2024-01-30 NOTE — ASSESSMENT & PLAN NOTE
-Declines annual influenza vaccine, although recommended  -Previously received Covid vaccines 5/10/2021, 6/2/2021, 12/8/2021, 8/4/2022, Recommend Covid vaccine, she plans to get  -Recommend RSV vaccine, plans  -Prior Tdap 5/14/2010, recommend, plans  -Previously received first Shingrix vaccine 8/4/2022, recommend second  -Previously received PPSV23 12/8/2021, recommend Prevnar 20 at age 63  -Mammogram 11/9/2023, repeat 1 yr  -Colonoscopy 10/7/2014 Dr. Casey, repeat 10 yrs  -Recommend annual gynecology exam and pap if needed  -Labs ordered Hep C Antibody, CMP CBC Fx Lipids

## 2024-01-30 NOTE — ASSESSMENT & PLAN NOTE
"-Per patient, was not really pain but \"uncomfortable\" thought indigestion  -EKG today given age and Htn  "

## 2024-02-15 DIAGNOSIS — Z12.11 SCREEN FOR COLON CANCER: Primary | ICD-10-CM

## 2024-02-16 RX ORDER — POLYETHYLENE GLYCOL 3350, SODIUM SULFATE ANHYDROUS, SODIUM BICARBONATE, SODIUM CHLORIDE, POTASSIUM CHLORIDE 236; 22.74; 6.74; 5.86; 2.97 G/4L; G/4L; G/4L; G/4L; G/4L
4000 POWDER, FOR SOLUTION ORAL ONCE
Qty: 4000 ML | Refills: 0 | Status: SHIPPED | OUTPATIENT
Start: 2024-02-16 | End: 2024-02-16

## 2024-04-11 ENCOUNTER — OFFICE VISIT (OUTPATIENT)
Dept: OBSTETRICS AND GYNECOLOGY | Facility: CLINIC | Age: 59
End: 2024-04-11
Payer: COMMERCIAL

## 2024-04-11 VITALS
HEIGHT: 65 IN | SYSTOLIC BLOOD PRESSURE: 140 MMHG | DIASTOLIC BLOOD PRESSURE: 86 MMHG | WEIGHT: 235 LBS | BODY MASS INDEX: 39.15 KG/M2

## 2024-04-11 DIAGNOSIS — Z11.3 SCREEN FOR STD (SEXUALLY TRANSMITTED DISEASE): Primary | ICD-10-CM

## 2024-04-11 DIAGNOSIS — Z12.4 SCREENING FOR CERVICAL CANCER: ICD-10-CM

## 2024-04-11 PROCEDURE — 3008F BODY MASS INDEX DOCD: CPT | Performed by: OBSTETRICS & GYNECOLOGY

## 2024-04-11 PROCEDURE — 3077F SYST BP >= 140 MM HG: CPT | Performed by: OBSTETRICS & GYNECOLOGY

## 2024-04-11 PROCEDURE — 87491 CHLMYD TRACH DNA AMP PROBE: CPT

## 2024-04-11 PROCEDURE — 3079F DIAST BP 80-89 MM HG: CPT | Performed by: OBSTETRICS & GYNECOLOGY

## 2024-04-11 PROCEDURE — 99386 PREV VISIT NEW AGE 40-64: CPT | Performed by: OBSTETRICS & GYNECOLOGY

## 2024-04-11 PROCEDURE — 87591 N.GONORRHOEAE DNA AMP PROB: CPT

## 2024-04-11 PROCEDURE — 87800 DETECT AGNT MULT DNA DIREC: CPT

## 2024-04-11 PROCEDURE — 87661 TRICHOMONAS VAGINALIS AMPLIF: CPT

## 2024-04-11 ASSESSMENT — ENCOUNTER SYMPTOMS
BLOOD IN STOOL: 0
ABDOMINAL DISTENTION: 0
CONSTITUTIONAL NEGATIVE: 0
PSYCHIATRIC NEGATIVE: 0
CARDIOVASCULAR NEGATIVE: 0
NAUSEA: 0
VOMITING: 0
RESPIRATORY NEGATIVE: 0
COLOR CHANGE: 0
FEVER: 0
EYES NEGATIVE: 0
ALLERGIC/IMMUNOLOGIC NEGATIVE: 0
MUSCULOSKELETAL NEGATIVE: 0
FREQUENCY: 0
DYSURIA: 0
NEUROLOGICAL NEGATIVE: 0
GASTROINTESTINAL NEGATIVE: 0
HEMATURIA: 0
SLEEP DISTURBANCE: 0
FATIGUE: 0
UNEXPECTED WEIGHT CHANGE: 0
HEMATOLOGIC/LYMPHATIC NEGATIVE: 0
BACK PAIN: 0
APPETITE CHANGE: 0
CHILLS: 0
ENDOCRINE NEGATIVE: 0
CONSTIPATION: 0
FLANK PAIN: 0
SHORTNESS OF BREATH: 0
ABDOMINAL PAIN: 0
DIARRHEA: 0

## 2024-04-11 ASSESSMENT — PAIN SCALES - GENERAL: PAINLEVEL: 0-NO PAIN

## 2024-04-11 NOTE — PROGRESS NOTES
History Of Present Illness  Routine Gyn Exam  Sheela Davis here for routine WWE.  Hysterectomy (ELN/BSO) at ? 46yo for bleeding/fibroids.      Reviewed pertinent medical, surgical, social and family history with patient.  Concerns: none.   Exercise: trying to restart exercise routine .    Gynecologic History  Menopause: surgical menopause around 44 yo at time of hyst/BSO (BSO completed at that time d/t mother with h/o ovarian cancer)  HRT use: never  Pap history: h/o abnormal pap   Sexually active: recently sexually active with new male partner   GYN concerns in past: abnormal pap smear and conization in her 30s ; normal pap after cone bx    Obstetric History  OB History   No obstetric history on file.        Past Medical History  She has a past medical history of Body mass index (BMI) 38.0-38.9, adult (10/14/2022), Class 2 obesity with body mass index (BMI) of 38.0 to 38.9 in adult (02/13/2023), Lymphedema, not elsewhere classified (09/10/2015), Obesity, unspecified (01/04/2023), Other conditions influencing health status (04/05/2018), Other conditions influencing health status (04/05/2018), Other microscopic hematuria (09/10/2015), Pain in unspecified shoulder (10/21/2014), Personal history of diseases of the skin and subcutaneous tissue (09/10/2015), Personal history of other (healed) physical injury and trauma (04/30/2017), Personal history of other diseases of the circulatory system (04/23/2022), Personal history of other diseases of the digestive system (10/12/2016), Personal history of other diseases of the respiratory system (04/05/2018), Personal history of other diseases of the respiratory system (04/05/2018), Personal history of other diseases of the respiratory system (07/18/2015), Personal history of other diseases of the respiratory system (10/21/2014), Personal history of other endocrine, nutritional and metabolic disease (05/31/2017), Personal history of other specified conditions (05/11/2017),  Persons encountering health services in other specified circumstances (2023), Strain of other muscles, fascia and tendons at shoulder and upper arm level, right arm, initial encounter (2017), and Unspecified rotator cuff tear or rupture of right shoulder, not specified as traumatic (2019).    Surgical History  She has a past surgical history that includes Total abdominal hysterectomy w/ bilateral salpingoophorectomy (2013); Other surgical history (2022); Other surgical history (2022); Other surgical history (2022); Colonoscopy (10/21/2014); and Breast biopsy (Left).     Social History  She reports that she quit smoking about 34 years ago. Her smoking use included cigarettes. She started smoking about 48 years ago. She has a 14 pack-year smoking history. She has never used smokeless tobacco. She reports current alcohol use. She reports that she does not use drugs.    Family History  Family History   Problem Relation Name Age of Onset    Ovarian cancer Mother  73    Other (Coronary Arteriosclerosis) Father          91  (CAD in 60s)    Other (ADHD) Brother      Other (ADHD) Son          Allergies  Clindamycin    Review of Systems   Constitutional:  Negative for appetite change, chills, fatigue, fever and unexpected weight change.   Respiratory:  Negative for shortness of breath.    Cardiovascular:  Negative for chest pain.   Gastrointestinal:  Negative for abdominal distention, abdominal pain, blood in stool, constipation, diarrhea, nausea and vomiting.   Endocrine: Negative for cold intolerance and heat intolerance.   Genitourinary:  Negative for dyspareunia, dysuria, flank pain, frequency, genital sores, hematuria, menstrual problem, pelvic pain, urgency, vaginal bleeding, vaginal discharge and vaginal pain.   Musculoskeletal:  Negative for back pain.   Skin:  Negative for color change.   Psychiatric/Behavioral:  Negative for sleep disturbance.        /86 (BP  "Location: Right arm, Patient Position: Sitting, BP Cuff Size: Adult)   Ht 1.651 m (5' 5\")   Wt 107 kg (235 lb)   LMP  (LMP Unknown)   BMI 39.11 kg/m²      Physical Exam  Constitutional:       Appearance: Normal appearance.   HENT:      Head: Normocephalic and atraumatic.   Chest:   Breasts:     Right: Normal.      Left: Normal.   Abdominal:      General: Abdomen is flat.      Palpations: Abdomen is soft.      Tenderness: There is no abdominal tenderness.   Genitourinary:     General: Normal vulva.      Vagina: Normal.      Uterus: Absent.       Adnexa: Right adnexa normal and left adnexa normal.      Comments: Cervix and uterus surgically absent  Vaginal cuff wnl     Skin:     General: Skin is warm and dry.   Neurological:      Mental Status: She is alert and oriented to person, place, and time.   Psychiatric:         Mood and Affect: Mood normal.               Assessment/Plan         Routine Well Woman Exam Today  Discussed diet and exercise.   Reviewed routine health screenings.   Pap: pt is s/p hysterectomy - never had pap following hysterectomy ; questionable history of dysplasia but unsure if mild/moderate/severe and treated > 20+ years ago - exam wnl, pap no longer needed   Recommend annual mammograms.   Currently up to date on colon cancer screening.   STI screen today d/t new partner                Betina Castillo MD  "

## 2024-04-12 LAB
C TRACH RRNA SPEC QL NAA+PROBE: NEGATIVE
N GONORRHOEA DNA SPEC QL PROBE+SIG AMP: NEGATIVE
T VAGINALIS RRNA SPEC QL NAA+PROBE: NEGATIVE

## 2024-04-17 ENCOUNTER — OFFICE VISIT (OUTPATIENT)
Dept: GASTROENTEROLOGY | Facility: EXTERNAL LOCATION | Age: 59
End: 2024-04-17
Payer: COMMERCIAL

## 2024-04-17 ENCOUNTER — TELEPHONE (OUTPATIENT)
Dept: PRIMARY CARE | Facility: CLINIC | Age: 59
End: 2024-04-17

## 2024-04-17 DIAGNOSIS — I10 BENIGN HYPERTENSION: ICD-10-CM

## 2024-04-17 DIAGNOSIS — Z12.11 SCREEN FOR COLON CANCER: ICD-10-CM

## 2024-04-17 DIAGNOSIS — K57.92 DIVERTICULITIS: Primary | ICD-10-CM

## 2024-04-17 DIAGNOSIS — I10 PRIMARY HYPERTENSION: ICD-10-CM

## 2024-04-17 DIAGNOSIS — R73.03 PREDIABETES: ICD-10-CM

## 2024-04-17 DIAGNOSIS — K57.30 DIVERTICULOSIS OF LARGE INTESTINE WITHOUT DIVERTICULITIS: ICD-10-CM

## 2024-04-17 DIAGNOSIS — G47.30 SLEEP APNEA, UNSPECIFIED TYPE: ICD-10-CM

## 2024-04-17 PROCEDURE — 45378 DIAGNOSTIC COLONOSCOPY: CPT | Performed by: INTERNAL MEDICINE

## 2024-04-17 PROCEDURE — 3008F BODY MASS INDEX DOCD: CPT | Performed by: INTERNAL MEDICINE

## 2024-04-17 RX ORDER — AMOXICILLIN AND CLAVULANATE POTASSIUM 875; 125 MG/1; MG/1
875 TABLET, FILM COATED ORAL 2 TIMES DAILY
Qty: 14 TABLET | Refills: 0 | Status: SHIPPED | OUTPATIENT
Start: 2024-04-17 | End: 2024-04-24

## 2024-04-18 RX ORDER — METFORMIN HYDROCHLORIDE 500 MG/1
500 TABLET ORAL
Qty: 90 TABLET | Refills: 1 | Status: CANCELLED | OUTPATIENT
Start: 2024-04-18 | End: 2025-04-18

## 2024-04-24 RX ORDER — AMLODIPINE BESYLATE 5 MG/1
5 TABLET ORAL DAILY
Qty: 90 TABLET | Refills: 2 | Status: SHIPPED | OUTPATIENT
Start: 2024-04-24

## 2024-04-24 RX ORDER — LOSARTAN POTASSIUM 50 MG/1
50 TABLET ORAL DAILY
Qty: 90 TABLET | Refills: 2 | Status: SHIPPED | OUTPATIENT
Start: 2024-04-24

## 2024-04-29 ENCOUNTER — LAB (OUTPATIENT)
Dept: LAB | Facility: LAB | Age: 59
End: 2024-04-29
Payer: COMMERCIAL

## 2024-04-29 DIAGNOSIS — Z00.00 ANNUAL PHYSICAL EXAM: ICD-10-CM

## 2024-04-29 DIAGNOSIS — R73.03 PREDIABETES: ICD-10-CM

## 2024-04-29 DIAGNOSIS — Z11.59 ENCOUNTER FOR SCREENING FOR OTHER VIRAL DISEASES: ICD-10-CM

## 2024-04-29 LAB
ALBUMIN SERPL BCP-MCNC: 4.1 G/DL (ref 3.4–5)
ALP SERPL-CCNC: 89 U/L (ref 33–136)
ALT SERPL W P-5'-P-CCNC: 17 U/L (ref 7–45)
ANION GAP SERPL CALC-SCNC: 13 MMOL/L (ref 10–20)
AST SERPL W P-5'-P-CCNC: 12 U/L (ref 9–39)
BILIRUB SERPL-MCNC: 0.5 MG/DL (ref 0–1.2)
BUN SERPL-MCNC: 10 MG/DL (ref 6–23)
CALCIUM SERPL-MCNC: 9.5 MG/DL (ref 8.6–10.6)
CHLORIDE SERPL-SCNC: 103 MMOL/L (ref 98–107)
CHOLEST SERPL-MCNC: 183 MG/DL (ref 0–199)
CHOLESTEROL/HDL RATIO: 3.5
CO2 SERPL-SCNC: 29 MMOL/L (ref 21–32)
CREAT SERPL-MCNC: 0.84 MG/DL (ref 0.5–1.05)
EGFRCR SERPLBLD CKD-EPI 2021: 80 ML/MIN/1.73M*2
ERYTHROCYTE [DISTWIDTH] IN BLOOD BY AUTOMATED COUNT: 14.8 % (ref 11.5–14.5)
EST. AVERAGE GLUCOSE BLD GHB EST-MCNC: 117 MG/DL
GLUCOSE SERPL-MCNC: 90 MG/DL (ref 74–99)
HBA1C MFR BLD: 5.7 %
HCT VFR BLD AUTO: 41 % (ref 36–46)
HCV AB SER QL: NONREACTIVE
HDLC SERPL-MCNC: 51.7 MG/DL
HGB BLD-MCNC: 13.2 G/DL (ref 12–16)
LDLC SERPL CALC-MCNC: 111 MG/DL
MCH RBC QN AUTO: 27.2 PG (ref 26–34)
MCHC RBC AUTO-ENTMCNC: 32.2 G/DL (ref 32–36)
MCV RBC AUTO: 85 FL (ref 80–100)
NON HDL CHOLESTEROL: 131 MG/DL (ref 0–149)
NRBC BLD-RTO: 0 /100 WBCS (ref 0–0)
PLATELET # BLD AUTO: 368 X10*3/UL (ref 150–450)
POTASSIUM SERPL-SCNC: 4 MMOL/L (ref 3.5–5.3)
PROT SERPL-MCNC: 7.4 G/DL (ref 6.4–8.2)
RBC # BLD AUTO: 4.85 X10*6/UL (ref 4–5.2)
SODIUM SERPL-SCNC: 141 MMOL/L (ref 136–145)
TRIGL SERPL-MCNC: 102 MG/DL (ref 0–149)
VLDL: 20 MG/DL (ref 0–40)
WBC # BLD AUTO: 9.9 X10*3/UL (ref 4.4–11.3)

## 2024-04-29 PROCEDURE — 36415 COLL VENOUS BLD VENIPUNCTURE: CPT

## 2024-04-29 PROCEDURE — 85027 COMPLETE CBC AUTOMATED: CPT

## 2024-04-29 PROCEDURE — 80053 COMPREHEN METABOLIC PANEL: CPT

## 2024-04-29 PROCEDURE — 86803 HEPATITIS C AB TEST: CPT

## 2024-04-29 PROCEDURE — 83036 HEMOGLOBIN GLYCOSYLATED A1C: CPT

## 2024-04-29 PROCEDURE — 80061 LIPID PANEL: CPT

## 2024-05-02 DIAGNOSIS — E78.5 HYPERLIPIDEMIA, UNSPECIFIED HYPERLIPIDEMIA TYPE: Primary | ICD-10-CM

## 2024-05-02 RX ORDER — METFORMIN HYDROCHLORIDE 500 MG/1
500 TABLET ORAL
Qty: 90 TABLET | Refills: 0 | Status: SHIPPED | OUTPATIENT
Start: 2024-05-02

## 2024-05-02 RX ORDER — ROSUVASTATIN CALCIUM 5 MG/1
5 TABLET, COATED ORAL DAILY
Qty: 100 TABLET | Refills: 0 | Status: SHIPPED | OUTPATIENT
Start: 2024-05-02 | End: 2025-06-06

## 2024-07-03 ENCOUNTER — APPOINTMENT (OUTPATIENT)
Dept: PRIMARY CARE | Facility: CLINIC | Age: 59
End: 2024-07-03
Payer: COMMERCIAL

## 2024-07-03 PROBLEM — M17.12 OSTEOARTHRITIS OF LEFT KNEE: Status: ACTIVE | Noted: 2024-07-03

## 2024-07-03 PROBLEM — M25.569 KNEE PAIN: Status: ACTIVE | Noted: 2024-07-03

## 2024-07-03 PROBLEM — R03.0 ELEVATED BLOOD PRESSURE READING WITHOUT DIAGNOSIS OF HYPERTENSION: Status: ACTIVE | Noted: 2019-10-03

## 2024-07-03 PROBLEM — T14.8XXA MUSCLE STRAIN: Status: ACTIVE | Noted: 2024-07-03

## 2024-07-03 PROBLEM — L03.90 CELLULITIS: Status: ACTIVE | Noted: 2024-07-03

## 2024-07-03 PROBLEM — K42.9 UMBILICAL HERNIA: Status: ACTIVE | Noted: 2024-07-03

## 2024-07-03 PROBLEM — R53.83 FATIGUE: Status: ACTIVE | Noted: 2024-07-03

## 2024-07-03 PROBLEM — R92.8 ABNORMAL MAMMOGRAM: Status: ACTIVE | Noted: 2021-04-07

## 2024-08-02 DIAGNOSIS — R73.03 PREDIABETES: ICD-10-CM

## 2024-08-02 DIAGNOSIS — E78.5 HYPERLIPIDEMIA, UNSPECIFIED HYPERLIPIDEMIA TYPE: ICD-10-CM

## 2024-08-02 RX ORDER — METFORMIN HYDROCHLORIDE 500 MG/1
500 TABLET ORAL
Qty: 90 TABLET | Refills: 1 | Status: SHIPPED | OUTPATIENT
Start: 2024-08-02

## 2024-08-02 RX ORDER — ROSUVASTATIN CALCIUM 5 MG/1
5 TABLET, COATED ORAL DAILY
Qty: 90 TABLET | Refills: 1 | Status: SHIPPED | OUTPATIENT
Start: 2024-08-02

## 2024-08-06 DIAGNOSIS — R73.03 PREDIABETES: Primary | ICD-10-CM

## 2024-08-12 ENCOUNTER — LAB (OUTPATIENT)
Dept: LAB | Facility: LAB | Age: 59
End: 2024-08-12
Payer: COMMERCIAL

## 2024-08-12 DIAGNOSIS — R73.03 PREDIABETES: ICD-10-CM

## 2024-08-12 DIAGNOSIS — E78.5 HYPERLIPIDEMIA, UNSPECIFIED HYPERLIPIDEMIA TYPE: ICD-10-CM

## 2024-08-12 LAB
ALBUMIN SERPL BCP-MCNC: 4.2 G/DL (ref 3.4–5)
ALP SERPL-CCNC: 108 U/L (ref 33–136)
ALT SERPL W P-5'-P-CCNC: 17 U/L (ref 7–45)
AST SERPL W P-5'-P-CCNC: 15 U/L (ref 9–39)
BILIRUB DIRECT SERPL-MCNC: 0.1 MG/DL (ref 0–0.3)
BILIRUB SERPL-MCNC: 0.4 MG/DL (ref 0–1.2)
CHOLEST SERPL-MCNC: 188 MG/DL (ref 0–199)
CHOLESTEROL/HDL RATIO: 3.1
CK SERPL-CCNC: 59 U/L (ref 0–215)
EST. AVERAGE GLUCOSE BLD GHB EST-MCNC: 123 MG/DL
HBA1C MFR BLD: 5.9 %
HDLC SERPL-MCNC: 59.7 MG/DL
LDLC SERPL CALC-MCNC: 107 MG/DL
NON HDL CHOLESTEROL: 128 MG/DL (ref 0–149)
PROT SERPL-MCNC: 7.7 G/DL (ref 6.4–8.2)
TRIGL SERPL-MCNC: 106 MG/DL (ref 0–149)
VLDL: 21 MG/DL (ref 0–40)

## 2024-08-12 PROCEDURE — 36415 COLL VENOUS BLD VENIPUNCTURE: CPT

## 2024-08-12 PROCEDURE — 82550 ASSAY OF CK (CPK): CPT

## 2024-08-12 PROCEDURE — 83036 HEMOGLOBIN GLYCOSYLATED A1C: CPT

## 2024-08-12 PROCEDURE — 80076 HEPATIC FUNCTION PANEL: CPT

## 2024-08-12 PROCEDURE — 80061 LIPID PANEL: CPT

## 2024-11-29 ENCOUNTER — APPOINTMENT (OUTPATIENT)
Dept: PRIMARY CARE | Facility: CLINIC | Age: 59
End: 2024-11-29
Payer: COMMERCIAL

## 2025-01-25 ENCOUNTER — APPOINTMENT (OUTPATIENT)
Dept: PRIMARY CARE | Facility: CLINIC | Age: 60
End: 2025-01-25
Payer: COMMERCIAL

## 2025-02-03 ENCOUNTER — APPOINTMENT (OUTPATIENT)
Dept: PRIMARY CARE | Facility: CLINIC | Age: 60
End: 2025-02-03
Payer: COMMERCIAL

## 2025-02-24 DIAGNOSIS — I10 PRIMARY HYPERTENSION: ICD-10-CM

## 2025-02-24 RX ORDER — AMLODIPINE BESYLATE 5 MG/1
5 TABLET ORAL DAILY
Qty: 90 TABLET | Refills: 0 | Status: SHIPPED | OUTPATIENT
Start: 2025-02-24

## 2025-02-24 RX ORDER — LOSARTAN POTASSIUM 50 MG/1
50 TABLET ORAL DAILY
Qty: 90 TABLET | Refills: 0 | Status: SHIPPED | OUTPATIENT
Start: 2025-02-24

## 2025-03-07 ENCOUNTER — APPOINTMENT (OUTPATIENT)
Dept: PRIMARY CARE | Facility: CLINIC | Age: 60
End: 2025-03-07
Payer: COMMERCIAL

## 2025-03-07 VITALS
WEIGHT: 248 LBS | OXYGEN SATURATION: 95 % | HEART RATE: 109 BPM | SYSTOLIC BLOOD PRESSURE: 130 MMHG | HEIGHT: 65 IN | BODY MASS INDEX: 41.32 KG/M2 | DIASTOLIC BLOOD PRESSURE: 60 MMHG

## 2025-03-07 DIAGNOSIS — R60.9 LIPOEDEMA: ICD-10-CM

## 2025-03-07 DIAGNOSIS — R73.03 PREDIABETES: ICD-10-CM

## 2025-03-07 DIAGNOSIS — Z00.00 ANNUAL PHYSICAL EXAM: Primary | ICD-10-CM

## 2025-03-07 DIAGNOSIS — G89.29 CHRONIC PAIN OF BOTH KNEES: ICD-10-CM

## 2025-03-07 DIAGNOSIS — E88.810 METABOLIC SYNDROME: ICD-10-CM

## 2025-03-07 DIAGNOSIS — I10 BENIGN HYPERTENSION: ICD-10-CM

## 2025-03-07 DIAGNOSIS — J45.20 MILD INTERMITTENT ASTHMA WITHOUT COMPLICATION (HHS-HCC): ICD-10-CM

## 2025-03-07 DIAGNOSIS — Z12.31 ENCOUNTER FOR SCREENING MAMMOGRAM FOR MALIGNANT NEOPLASM OF BREAST: ICD-10-CM

## 2025-03-07 DIAGNOSIS — G47.33 OSA (OBSTRUCTIVE SLEEP APNEA): ICD-10-CM

## 2025-03-07 DIAGNOSIS — M25.562 CHRONIC PAIN OF BOTH KNEES: ICD-10-CM

## 2025-03-07 DIAGNOSIS — E78.5 HYPERLIPIDEMIA, UNSPECIFIED HYPERLIPIDEMIA TYPE: ICD-10-CM

## 2025-03-07 DIAGNOSIS — M25.561 CHRONIC PAIN OF BOTH KNEES: ICD-10-CM

## 2025-03-07 PROCEDURE — 99396 PREV VISIT EST AGE 40-64: CPT | Performed by: SPECIALIST

## 2025-03-07 PROCEDURE — 3078F DIAST BP <80 MM HG: CPT | Performed by: SPECIALIST

## 2025-03-07 PROCEDURE — 3074F SYST BP LT 130 MM HG: CPT | Performed by: SPECIALIST

## 2025-03-07 PROCEDURE — 3008F BODY MASS INDEX DOCD: CPT | Performed by: SPECIALIST

## 2025-03-07 RX ORDER — IBUPROFEN 800 MG/1
800 TABLET ORAL EVERY 8 HOURS PRN
Qty: 30 TABLET | Refills: 0 | Status: SHIPPED | OUTPATIENT
Start: 2025-03-07

## 2025-03-07 NOTE — ASSESSMENT & PLAN NOTE
Recommend annual influenza vaccine  Recommend Covid vaccine  Pneumovax 12/8/2021 recommend Prevnar 21 or 20  Tdap 11/1/2012 recommend   Recommend second Shingrix vaccine  Mammo 11/9/2023   Colonosc 4/17/2024 repeat 10 years  Gynecology exam 4/11/2024, pap no longer needed  Labs:  A1C 8/2024 5.9,  8/2024, Normal CMP CBC 4/2024  Labs ordered  Orders:    Lipid Panel; Future    Comprehensive Metabolic Panel; Future    Hemoglobin A1C; Future    CBC; Future    Lipoprotein a; Future

## 2025-03-07 NOTE — ASSESSMENT & PLAN NOTE
Controlled on current medication  Orders:    Comprehensive Metabolic Panel; Future    CBC; Future

## 2025-03-07 NOTE — ASSESSMENT & PLAN NOTE
Not using CPAP, didn't tolerate it  Interested in GLP-1  Orders:    Referral to Clinical Pharmacy; Future

## 2025-03-07 NOTE — ASSESSMENT & PLAN NOTE
Discussed weight loss would help  Declined ortho, not yet ready to consider Synvisc or steroid injection and wants to wait  Take tylenol 1000 mg 3x daily  Risks of NSAIDS   Takes 800 mg 1-2 x month with relief (Aleve 220 mg TWO capsules once per day)  Takes tylenol arthritis 650 mg bid  Refilled ibuprofen (knows not to take with Aleve)  Orders:    ibuprofen 800 mg tablet; Take 1 tablet (800 mg) by mouth every 8 hours if needed for moderate pain (4 - 6).

## 2025-03-07 NOTE — PATIENT INSTRUCTIONS
Recommend annual influenza vaccine  Recommend Covid vaccine   Recommend Prevnar 21 or 20 (Pneumonia vaccine)  Recommend Tdap   Recommend second Shingrix vaccine

## 2025-03-07 NOTE — PROGRESS NOTES
Subjective   Patient ID: Sheela Davis is a 59 y.o. female who presents for Annual Exam.  HPI    58 yo female retired  working at [employer] Asthma, Hypertension, Lipoedema, Hiradenitis Supparativa Obesity BMI 41, LISA (not using machine) presents for Annual Exam    Has LISA  Saw an article about a medication that may help  Has not needed nebulizer or MDI    Never got Trulicity since it was not covered  Interested in GLP 1 for her LISA  Has prediabetes    Is in pain daily   Takes tylenol arthritis 650 mg bid and aleve 220 mg TWO capsules once daily for her knee    Allergies   Allergen Reactions    Clindamycin Hives      Current Outpatient Medications   Medication Instructions    albuterol (ProAir HFA) 90 mcg/actuation inhaler 1 puff, Every 4 hours PRN    albuterol 2.5 mg /3 mL (0.083 %) nebulizer solution Inhale. Give x 1    amLODIPine (NORVASC) 5 mg, oral, Daily    benzoyl peroxide (Benzac AC) 10 % external wash Daily PRN    cholecalciferol (Vitamin D-3) 25 MCG (1000 UT) capsule Daily    ciclopirox (Loprox) 1 % shampoo Apply topically.    clobetasol (Temovate) 0.05 % ointment 1 Application, Every other day    diclofenac sodium 1 % kit 1 Application, 4 times daily    ibuprofen 800 mg, oral, Every 8 hours PRN    ketoconazole (NIZOral) 2 % shampoo PLEASE SEE ATTACHED FOR DETAILED DIRECTIONS    losartan (COZAAR) 50 mg, oral, Daily    metFORMIN (GLUCOPHAGE) 500 mg, oral, Daily with breakfast    rosuvastatin (CRESTOR) 5 mg, oral, Daily        Review of Systems  Constitutional  Always fatigue (not using CPAP, tried nasal pillows), no fevers, no chills, no unintentional weight loss,   HEENT:  No headaches, no dizziness,  eye exams due, no double vision, no blurred vision, no hearing loss  Cardiovascular:  No chest pain, no palpitations, Positive shortness of breath with exertion (one flight of stairs),   Respiratory:  No cough feels a coating after she falls asleep and wakes up clear when she coughs, no  "hemoptysis, no wheezing, No shortness of breath at rest  GI:  No dysphagia, no odynophagia, no reflux, occasional abdominal pain lower (prior abd surgery) like a cramp some times walking helps alleviate it, no other abdominal pain, no nausea, no vomiting, no changes in bowel habits, no bright red blood per rectum, no melena  :  No urinary frequency, no dysuria, no urine incontinence  MSK:  No falls, b/l left greater than right joint pain, no joint swelling  Neuro:  No tremors, no extremity weakness, burning sensation and swelling in legs from lymphedema  Breasts:  No abnormalities on self breast exam no nipple discharge no skin changes    Physical Exam  /60   Pulse 109   Ht 1.651 m (5' 5\")   LMP  (LMP Unknown)   SpO2 95%   BMI 39.11 kg/m²   General:    Well-appearing  F in no acute distress, well nourished, well hydrated  Head:  Normocephalic, atraumatic  Skin:          Warm dry,   Eyes:  Anicteric sclera, pupils equal,   Ears:        TMs intact  Oral:      Not examined due to pandemic  Neck:   Supple  Cor:      Regular rate, normal S1, S2, no murmurs appreciated, no S3, no S4   Lungs:   Clear to auscultation b/l, no wheezes, no rhonchi, no crackles, no accessory respiratory muscle use  Oral:      Not examined due to pandemic  Neck:   Supple, no cervical/supraclavicular adenopathy, no thyromegaly or nodules appreciated on exam  Cor:      Regular rate, normal S1, S2, no murmurs appreciated, no S3, no S4   Lungs:   Clear to auscultation b/l, no wheezes, no rhonchi, no crackles, no accessory respiratory muscle use  Abd:          Soft, nontender, no guarding, no rebound, no hepatosplenomegaly appreciated   Ext:            No lower extremity edema, no palpable cords  Pulses:      Pedal pulses intact  Neuro:   CN2-12 grossly intact  (except funduscopic exam not performed)    Assessment/Plan   Assessment & Plan  Metabolic syndrome  Discussed Waist 50\", on metformin for elevated glucose, On blood pressure " medication for Htn  (PreDM, BMI 41, on Statin)  Also has LISA  Referred to pharmacy, interested in GLP-1  Orders:    Referral to Clinical Pharmacy; Future    Lipid Panel; Future    Comprehensive Metabolic Panel; Future    Hemoglobin A1C; Future    CBC; Future    Lipoprotein a; Future    Annual physical exam  Recommend annual influenza vaccine  Recommend Covid vaccine  Pneumovax 12/8/2021 recommend Prevnar 21 or 20  Tdap 11/1/2012 recommend   Recommend second Shingrix vaccine  Mammo 11/9/2023   Colonosc 4/17/2024 repeat 10 years  Gynecology exam 4/11/2024, pap no longer needed  Labs:  A1C 8/2024 5.9,  8/2024, Normal CMP CBC 4/2024  Labs ordered  Orders:    Lipid Panel; Future    Comprehensive Metabolic Panel; Future    Hemoglobin A1C; Future    CBC; Future    Lipoprotein a; Future    Mild intermittent asthma without complication (HHS-HCC)  Continue Albuterol PRN  Also has nebulizer at home        Prediabetes  A1C 8/2024 was 5.9  On metformin for elevated glucose   and hypertensive  on treatment so Metabolic Syndrome  Orders:    Referral to Clinical Pharmacy; Future    Comprehensive Metabolic Panel; Future    Hemoglobin A1C; Future    CBC; Future    LISA (obstructive sleep apnea)  Not using CPAP, didn't tolerate it  Interested in GLP-1  Orders:    Referral to Clinical Pharmacy; Future    Lipoedema  Prior Surgery in [place]   Said she will need another surgery       Encounter for screening mammogram for malignant neoplasm of breast  Mammo 11/9/2023, ordered  Orders:    BI mammo bilateral screening tomosynthesis; Future    Chronic pain of both knees  Discussed weight loss would help  Declined ortho, not yet ready to consider Synvisc or steroid injection and wants to wait  Take tylenol 1000 mg 3x daily  Risks of NSAIDS   Takes 800 mg 1-2 x month with relief (Aleve 220 mg TWO capsules once per day)  Takes tylenol arthritis 650 mg bid  Refilled ibuprofen (knows not to take with Aleve)  Orders:    ibuprofen 800 mg  tablet; Take 1 tablet (800 mg) by mouth every 8 hours if needed for moderate pain (4 - 6).    Benign hypertension  Controlled on current medication  Orders:    Comprehensive Metabolic Panel; Future    CBC; Future    Hyperlipidemia, unspecified hyperlipidemia type  Continue rosuvastatin 5 mg daily pending blood work results    8/2024  Orders:    Lipid Panel; Future       Nina Clifton DO

## 2025-03-07 NOTE — ASSESSMENT & PLAN NOTE
A1C 8/2024 was 5.9  On metformin for elevated glucose   and hypertensive  on treatment so Metabolic Syndrome  Orders:    Referral to Clinical Pharmacy; Future    Comprehensive Metabolic Panel; Future    Hemoglobin A1C; Future    CBC; Future

## 2025-03-12 DIAGNOSIS — I10 PRIMARY HYPERTENSION: ICD-10-CM

## 2025-03-13 ENCOUNTER — TELEMEDICINE (OUTPATIENT)
Dept: PHARMACY | Facility: HOSPITAL | Age: 60
End: 2025-03-13
Payer: COMMERCIAL

## 2025-03-13 DIAGNOSIS — R73.03 PREDIABETES: ICD-10-CM

## 2025-03-13 DIAGNOSIS — E88.810 METABOLIC SYNDROME: ICD-10-CM

## 2025-03-13 DIAGNOSIS — G47.33 OSA (OBSTRUCTIVE SLEEP APNEA): ICD-10-CM

## 2025-03-13 RX ORDER — LOSARTAN POTASSIUM 50 MG/1
50 TABLET ORAL DAILY
Qty: 90 TABLET | Refills: 1 | Status: SHIPPED | OUTPATIENT
Start: 2025-03-13

## 2025-03-13 NOTE — PROGRESS NOTES
Clinical Pharmacy Appointment  Subjective     Patient ID: Sheela Davis is a 59 y.o. female who presents for Sleep Apnea and Obesity.    Referring Provider: Nina Clifton,      Medications Reviewed  Add  Multivitatmin Centrum Silver for women once daily  Omega-3 fish oil 1200mg once daily    Obstructive Sleep Apnea  Has not needed nebulizer or MDI  Patient needs moderate-to-severe LISA with a BMI >=30 kg/m^2 to qualify for Zepbound  Sleep Study performed 2/20/2023Moderate LISA 22.6/hr for RDI4% and severe LISA 35.1/hr for RDI3%     Patient would need to undergo a sleep study (polysomnogram) where sensors monitor your breathing, heart rate, and other vital signs during sleep, and the AHI is calculated by dividing the total number of apneas and hypopneas by the total hours of sleep.  AHI=apnea-hypopnea index    Her highest weight was 242 lbs and lowest weight was 200 lbs. She was at her lowest about 1.5 year ago due to surgery, anesthesia.     PRE DIABETES MELLITUS TYPE 2:    Does patient follow with Endocrinology: No  Last optometry exam: >1 year ago  Most recent visit in Podiatry: NA     Diet: 2 meals   LN: veggies peppers, onions, spinach on tortilla, chicken sausage or leftover chili-hamburger, tomatoes, onions, crackers  DN: fast food-fried chicken wings with mac n cheese and cabbage, sometimes leftovers from first meal, veggies and salsa  Snacks: popcorn, sun chips, rice cakes, sweet cravings pie, donut, cookies, or cake   Drinks: water, pepsi zero 1-2/day, jug of lemonade once a month, carbonated water [place]   Dietician seen 4/24/2023 She was advised to have less carbs, whole grains, good fats, and use the healthy plate method.     Exercise: sedentary, treadmill once a week, work out videos YouTube-strength training and getting in steps, gym membership-[employer], doesn't know what to do when she gets there  Pain with arthritis in knees, lymphedema in arms and legs, and weight    Allergies   Allergen  Reactions    Clindamycin Hives       Objective     Current DM Pharmacotherapy:   Metformin 500 mg once daily with breakfast    Clarifications to above regimen: sometimes miss dose 2 times a week  Adverse Effects: some GI distress    SECONDARY PREVENTION  - Statin? Yes, rosuvastatin 5 mg daily  - ACEi/ARB? Yes, losartan 50 mg daily  - Aspirin? No    Current monitoring regimen:   Patient is using:  not testing at this time    Testing frequency: NA    SMBG Readings: NA    Any episodes of hypoglycemia? No.  Did patient treat episode of hypoglycemia appropriately? N/A    Pertinent PMH Review:  - PMH of Pancreatitis: No  - PMH/FH of Medullary Thyroid Cancer: No  - PMH/FH of Multiple Endocrine Neoplasia (MEN) Type II: No  - PMH of Retinopathy: No  - PMH of Urinary Tract Infections/Yeast Infections: No    Immunizations:  Influenza? No-last time received 9/21/2009-recommended annually-will be getting  COVID? Yes- has not received 2024 dose-recommended annually-will be getting  Pneumonia? PPSV23 12/18/2021-recommended second dose at 66 yo  Shingles? Yes- 8/4/2022-recommended once after 51 yo  Hepatitis B? No-recommended 3-dose hepatitis B vaccine series (0, 1, and 6 months) for those under 61 yo   Tdap? No- 11/1/2012- recommended booster every 10 years-will be getting  RSV? No- recommended for 60-74 with high risk-not needed at this time pt does have asthma    Lab Review    BMP  Lab Results   Component Value Date    CALCIUM 9.5 04/29/2024     04/29/2024    K 4.0 04/29/2024    CO2 29 04/29/2024     04/29/2024    BUN 10 04/29/2024    CREATININE 0.84 04/29/2024    EGFR 80 04/29/2024     LFTs  Lab Results   Component Value Date    ALT 17 08/12/2024    AST 15 08/12/2024    ALKPHOS 108 08/12/2024    BILITOT 0.4 08/12/2024     FLP  Lab Results   Component Value Date    TRIG 106 08/12/2024    CHOL 188 08/12/2024    LDLF 119 (H) 05/07/2022    LDLCALC 107 (H) 08/12/2024    HDL 59.7 08/12/2024       The 10-year ASCVD risk  "score (Ricco SOUSA, et al., 2019) is: 6.9%    Values used to calculate the score:      Age: 59 years      Sex: Female      Is Non- : Yes      Diabetic: No      Tobacco smoker: No      Systolic Blood Pressure: 130 mmHg      Is BP treated: Yes      HDL Cholesterol: 59.7 mg/dL      Total Cholesterol: 188 mg/dL  Urine Microalbumin  No results found for: \"MICROALBCREA\"  No results found for: \"MICROALBCREA\"  Weight Management  Wt Readings from Last 3 Encounters:   03/07/25 112 kg (248 lb)   04/11/24 107 kg (235 lb)   01/24/24 109 kg (241 lb)      There is no height or weight on file to calculate BMI.   A1C  Lab Results   Component Value Date    HGBA1C 5.9 (H) 08/12/2024    HGBA1C 5.7 (H) 04/29/2024    HGBA1C 5.8 (A) 09/01/2023         Assessment/Plan     Problem List Items Addressed This Visit       Prediabetes     See LISA for plan         LISA (obstructive sleep apnea)     Patient diagnosed with LISA 2023. Patient meets moderate-to-severe LISA with a BMI >=30 kg/m^2 to qualify for Zepbound. Sleep Study performed 2/20/2023, Moderate LISA 22.6/hr for RDI4% and severe LISA 35.1/hr for RDI3%. Current regimen includes diet and exercise. Patient's current weight reported as 248 lbs. Due to not being able to lose weight through lifestyle changes alone will start Zepbound. Goal weight 199 lbs.     Medication Changes:  CONTINUE  Metformin 500 mg 1 tablet by mouth with breakfast  START  Zepbound 2.5 mg inject once weekly    Future Considerations:  Titrate as needed for Zepbound  Change to Metformin XR if GI side effects persist    Monitoring and Education:  Counseled patient on relevant medication mechanisms of action, expectations, side effects, duration of therapy, contraindications, administration, and monitoring parameters.  All questions and concerns addressed. Contact pharmacist with any further questions or concerns prior to next appointment.  Reviewed dietary recommendations: Healthy Plate method  Exercise " recommended 150 min per week, 5 days of moderate to high intensity exercise for 30 min.            Other Visit Diagnoses       Metabolic syndrome                Follow up: I recommend diabetes care be 4 weeks. 4/10 11am    April Hernandez, PharmD [facility]  Pharmacist Resident      Continue all meds under the continuation of care with the referring provider and clinical pharmacy team

## 2025-03-13 NOTE — ASSESSMENT & PLAN NOTE
Patient diagnosed with LISA 2023. Patient meets moderate-to-severe LISA with a BMI >=30 kg/m^2 to qualify for Zepbound. Sleep Study performed 2/20/2023, Moderate LISA 22.6/hr for RDI4% and severe LISA 35.1/hr for RDI3%. Current regimen includes diet and exercise. Patient's current weight reported as 248 lbs. Due to not being able to lose weight through lifestyle changes alone will start Zepbound. Goal weight 199 lbs.     Medication Changes:  CONTINUE  Metformin 500 mg 1 tablet by mouth with breakfast  START  Zepbound 2.5 mg inject once weekly    Future Considerations:  Titrate as needed for Zepbound  Change to Metformin XR if GI side effects persist    Monitoring and Education:  Counseled patient on relevant medication mechanisms of action, expectations, side effects, duration of therapy, contraindications, administration, and monitoring parameters.  All questions and concerns addressed. Contact pharmacist with any further questions or concerns prior to next appointment.  Reviewed dietary recommendations: Healthy Plate method  Exercise recommended 150 min per week, 5 days of moderate to high intensity exercise for 30 min.

## 2025-04-04 ENCOUNTER — HOSPITAL ENCOUNTER (OUTPATIENT)
Dept: RADIOLOGY | Facility: CLINIC | Age: 60
Discharge: HOME | End: 2025-04-04
Payer: MEDICARE

## 2025-04-04 VITALS — BODY MASS INDEX: 41.32 KG/M2 | HEIGHT: 65 IN | WEIGHT: 248 LBS

## 2025-04-04 DIAGNOSIS — Z12.31 ENCOUNTER FOR SCREENING MAMMOGRAM FOR MALIGNANT NEOPLASM OF BREAST: ICD-10-CM

## 2025-04-04 PROCEDURE — 77063 BREAST TOMOSYNTHESIS BI: CPT

## 2025-04-05 LAB
ALBUMIN SERPL-MCNC: 4.4 G/DL (ref 3.6–5.1)
ALP SERPL-CCNC: 89 U/L (ref 37–153)
ALT SERPL-CCNC: 17 U/L (ref 6–29)
ANION GAP SERPL CALCULATED.4IONS-SCNC: 10 MMOL/L (CALC) (ref 7–17)
AST SERPL-CCNC: 12 U/L (ref 10–35)
BILIRUB SERPL-MCNC: 0.3 MG/DL (ref 0.2–1.2)
BUN SERPL-MCNC: 10 MG/DL (ref 7–25)
CALCIUM SERPL-MCNC: 9.3 MG/DL (ref 8.6–10.4)
CHLORIDE SERPL-SCNC: 101 MMOL/L (ref 98–110)
CHOLEST SERPL-MCNC: 140 MG/DL
CHOLEST/HDLC SERPL: 2.2 (CALC)
CO2 SERPL-SCNC: 28 MMOL/L (ref 20–32)
CREAT SERPL-MCNC: 0.74 MG/DL (ref 0.5–1.05)
EGFRCR SERPLBLD CKD-EPI 2021: 92 ML/MIN/1.73M2
ERYTHROCYTE [DISTWIDTH] IN BLOOD BY AUTOMATED COUNT: 13.8 % (ref 11–15)
EST. AVERAGE GLUCOSE BLD GHB EST-MCNC: 137 MG/DL
EST. AVERAGE GLUCOSE BLD GHB EST-SCNC: 7.6 MMOL/L
GLUCOSE SERPL-MCNC: 98 MG/DL (ref 65–99)
HBA1C MFR BLD: 6.4 % OF TOTAL HGB
HCT VFR BLD AUTO: 41.9 % (ref 35–45)
HDLC SERPL-MCNC: 63 MG/DL
HGB BLD-MCNC: 13.5 G/DL (ref 11.7–15.5)
LDLC SERPL CALC-MCNC: 56 MG/DL (CALC)
LPA SERPL-SCNC: NORMAL NMOL/L
MCH RBC QN AUTO: 27.8 PG (ref 27–33)
MCHC RBC AUTO-ENTMCNC: 32.2 G/DL (ref 32–36)
MCV RBC AUTO: 86.2 FL (ref 80–100)
NONHDLC SERPL-MCNC: 77 MG/DL (CALC)
PLATELET # BLD AUTO: 351 THOUSAND/UL (ref 140–400)
PMV BLD REES-ECKER: 9.4 FL (ref 7.5–12.5)
POTASSIUM SERPL-SCNC: 4.4 MMOL/L (ref 3.5–5.3)
PROT SERPL-MCNC: 7.4 G/DL (ref 6.1–8.1)
RBC # BLD AUTO: 4.86 MILLION/UL (ref 3.8–5.1)
SODIUM SERPL-SCNC: 139 MMOL/L (ref 135–146)
TRIGL SERPL-MCNC: 119 MG/DL
WBC # BLD AUTO: 8.9 THOUSAND/UL (ref 3.8–10.8)

## 2025-04-09 LAB
ALBUMIN SERPL-MCNC: 4.4 G/DL (ref 3.6–5.1)
ALP SERPL-CCNC: 89 U/L (ref 37–153)
ALT SERPL-CCNC: 17 U/L (ref 6–29)
ANION GAP SERPL CALCULATED.4IONS-SCNC: 10 MMOL/L (CALC) (ref 7–17)
AST SERPL-CCNC: 12 U/L (ref 10–35)
BILIRUB SERPL-MCNC: 0.3 MG/DL (ref 0.2–1.2)
BUN SERPL-MCNC: 10 MG/DL (ref 7–25)
CALCIUM SERPL-MCNC: 9.3 MG/DL (ref 8.6–10.4)
CHLORIDE SERPL-SCNC: 101 MMOL/L (ref 98–110)
CHOLEST SERPL-MCNC: 140 MG/DL
CHOLEST/HDLC SERPL: 2.2 (CALC)
CO2 SERPL-SCNC: 28 MMOL/L (ref 20–32)
CREAT SERPL-MCNC: 0.74 MG/DL (ref 0.5–1.05)
EGFRCR SERPLBLD CKD-EPI 2021: 92 ML/MIN/1.73M2
ERYTHROCYTE [DISTWIDTH] IN BLOOD BY AUTOMATED COUNT: 13.8 % (ref 11–15)
EST. AVERAGE GLUCOSE BLD GHB EST-MCNC: 137 MG/DL
EST. AVERAGE GLUCOSE BLD GHB EST-SCNC: 7.6 MMOL/L
GLUCOSE SERPL-MCNC: 98 MG/DL (ref 65–99)
HBA1C MFR BLD: 6.4 % OF TOTAL HGB
HCT VFR BLD AUTO: 41.9 % (ref 35–45)
HDLC SERPL-MCNC: 63 MG/DL
HGB BLD-MCNC: 13.5 G/DL (ref 11.7–15.5)
LDLC SERPL CALC-MCNC: 56 MG/DL (CALC)
LPA SERPL-SCNC: 99 NMOL/L
MCH RBC QN AUTO: 27.8 PG (ref 27–33)
MCHC RBC AUTO-ENTMCNC: 32.2 G/DL (ref 32–36)
MCV RBC AUTO: 86.2 FL (ref 80–100)
NONHDLC SERPL-MCNC: 77 MG/DL (CALC)
PLATELET # BLD AUTO: 351 THOUSAND/UL (ref 140–400)
PMV BLD REES-ECKER: 9.4 FL (ref 7.5–12.5)
POTASSIUM SERPL-SCNC: 4.4 MMOL/L (ref 3.5–5.3)
PROT SERPL-MCNC: 7.4 G/DL (ref 6.1–8.1)
RBC # BLD AUTO: 4.86 MILLION/UL (ref 3.8–5.1)
SODIUM SERPL-SCNC: 139 MMOL/L (ref 135–146)
TRIGL SERPL-MCNC: 119 MG/DL
WBC # BLD AUTO: 8.9 THOUSAND/UL (ref 3.8–10.8)

## 2025-04-09 NOTE — PROGRESS NOTES
Clinical Pharmacy Appointment  Subjective     Patient ID: Sheela Davis is a 59 y.o. female who presents for Apnea.    Referring Provider: Nina Clifton,        Patient not able to obtain Zepbound due to plan exclusion. Patient reached out to [facility] to see if she could get coverage for LISA, will submit PA to plan. Discussed alternatives if denied for [employer] vials.    Medication from [employer]  Zepbound  2.5 mg vial $349  Zepbound 5 mg vial $499  $5 for syringe and alcohol swabs    OBSTRUCTIVE SLEEP APNEA:   Has not needed nebulizer or MDI  Patient needs moderate-to-severe LISA with a BMI >=30 kg/m^2 to qualify for Zepbound  Sleep Study performed 2/20/2023Moderate LISA 22.6/hr for RDI4% and severe LISA 35.1/hr for RDI3%     PRE DIABETES MELLITUS TYPE 2:    Does patient follow with Endocrinology: No  Last optometry exam: >1 year ago  Most recent visit in Podiatry: NA     Diet: 2 meals more whole foods and more intentional  LN: veggies peppers, onions, spinach on tortilla, chicken sausage or leftover chili-hamburger, tomatoes, onions, crackers  DN: fast food-fried chicken wings with mac n cheese and cabbage, sometimes leftovers from first meal, veggies and salsa  Snacks: popcorn, sun chips, rice cakes, sweet cravings pie, donut, cookies, or cake   Drinks: water, pepsi zero 1-2/day, jug of lemonade once a month, carbonated water [place]   Dietician seen 4/24/2023 She was advised to have less carbs, whole grains, good fats, and use the healthy plate method.     Exercise:   Trying to do more since seeing results from exam, being more intentional.  sedentary, treadmill once a week, work out videos YouTube-strength training and getting in steps, gym membership-[employer], doesn't know what to do when she gets there  Pain with arthritis in knees, lymphedema in arms and legs, and weight    Allergies   Allergen Reactions    Clindamycin Hives       Objective     Current DM Pharmacotherapy:   Metformin 500  mg once daily with breakfast    Clarifications to above regimen: sometimes miss dose 2 times a week  Adverse Effects: some GI distress    SECONDARY PREVENTION  - Statin? Yes, rosuvastatin 5 mg daily  - ACEi/ARB? Yes, losartan 50 mg daily  - Aspirin? No    Current monitoring regimen:   Patient is using:  not testing at this time    Testing frequency: NA    SMBG Readings: NA    Any episodes of hypoglycemia? No.  Did patient treat episode of hypoglycemia appropriately? N/A    Pertinent PMH Review:  - PMH of Pancreatitis: No  - PMH/FH of Medullary Thyroid Cancer: No  - PMH/FH of Multiple Endocrine Neoplasia (MEN) Type II: No  - PMH of Retinopathy: No  - PMH of Urinary Tract Infections/Yeast Infections: No    Immunizations:  Influenza? No-last time received 9/21/2009-recommended annually-will be getting  COVID? Yes- has not received 2024 dose-recommended annually-will be getting  Pneumonia? PPSV23 12/18/2021-recommended second dose at 64 yo  Shingles? Yes- 8/4/2022-recommended once after 51 yo  Hepatitis B? No-recommended 3-dose hepatitis B vaccine series (0, 1, and 6 months) for those under 59 yo   Tdap? No- 11/1/2012- recommended booster every 10 years-will be getting  RSV? No- recommended for 60-74 with high risk-not needed at this time pt does have asthma    Lab Review    BMP  Lab Results   Component Value Date    CALCIUM 9.3 04/04/2025     04/04/2025    K 4.4 04/04/2025    CO2 28 04/04/2025     04/04/2025    BUN 10 04/04/2025    CREATININE 0.74 04/04/2025    EGFR 92 04/04/2025     LFTs  Lab Results   Component Value Date    ALT 17 04/04/2025    AST 12 04/04/2025    ALKPHOS 89 04/04/2025    BILITOT 0.3 04/04/2025     FLP  Lab Results   Component Value Date    TRIG 119 04/04/2025    CHOL 140 04/04/2025    LDLF 119 (H) 05/07/2022    LDLCALC 56 04/04/2025    HDL 63 04/04/2025       The 10-year ASCVD risk score (Ricco SOUSA, et al., 2019) is: 5.1%    Values used to calculate the score:      Age: 59 years       "Sex: Female      Is Non- : Yes      Diabetic: No      Tobacco smoker: No      Systolic Blood Pressure: 130 mmHg      Is BP treated: Yes      HDL Cholesterol: 63 mg/dL      Total Cholesterol: 140 mg/dL  Urine Microalbumin  No results found for: \"MICROALBCREA\"  No results found for: \"MICROALBCREA\"  Weight Management  Wt Readings from Last 3 Encounters:   04/04/25 112 kg (248 lb)   03/07/25 112 kg (248 lb)   04/11/24 107 kg (235 lb)      There is no height or weight on file to calculate BMI.   A1C  Lab Results   Component Value Date    HGBA1C 6.4 (H) 04/04/2025    HGBA1C 5.9 (H) 08/12/2024    HGBA1C 5.7 (H) 04/29/2024         Assessment/Plan     Problem List Items Addressed This Visit       Prediabetes     See LISA for plan         Relevant Orders    Referral to Clinical Pharmacy    LISA (obstructive sleep apnea)     Patient diagnosed with LISA 2023. Patient meets moderate-to-severe LISA with a BMI >=30 kg/m^2 to qualify for Zepbound. Sleep Study performed 2/20/2023, Moderate LISA 22.6/hr for RDI4% and severe LISA 35.1/hr for RDI3%. Current regimen includes diet and exercise. Patient's current weight reported as 248 lbs. Due to not being able to lose weight through lifestyle changes alone will start Zepbound. Goal weight 199 lbs.     Medication Changes:  CONTINUE  Metformin 500 mg 1 tablet by mouth with breakfast  START  Zepbound 2.5 mg inject once weekly    Future Considerations:  Titrate as needed for Zepbound  Change to Metformin XR if GI side effects persist    Monitoring and Education:  Counseled patient on relevant medication mechanisms of action, expectations, side effects, duration of therapy, contraindications, administration, and monitoring parameters.  All questions and concerns addressed. Contact pharmacist with any further questions or concerns prior to next appointment.  Reviewed dietary recommendations: Healthy Plate method  Exercise recommended 150 min per week, 5 days of moderate to " high intensity exercise for 30 min.           Relevant Orders    Referral to Clinical Pharmacy     Follow up: I recommend LISA and pre-diabetes care be 1 week for PA for Zepbound. 4/17 10am    April Hernandez, PharmD [facility]  Pharmacist Resident      Continue all meds under the continuation of care with the referring provider and clinical pharmacy team

## 2025-04-10 ENCOUNTER — APPOINTMENT (OUTPATIENT)
Dept: PHARMACY | Facility: HOSPITAL | Age: 60
End: 2025-04-10
Payer: COMMERCIAL

## 2025-04-10 DIAGNOSIS — R73.03 PREDIABETES: ICD-10-CM

## 2025-04-10 DIAGNOSIS — G47.33 OSA (OBSTRUCTIVE SLEEP APNEA): ICD-10-CM

## 2025-04-17 ENCOUNTER — APPOINTMENT (OUTPATIENT)
Dept: PHARMACY | Facility: HOSPITAL | Age: 60
End: 2025-04-17
Payer: COMMERCIAL

## 2025-04-17 DIAGNOSIS — G47.33 OSA (OBSTRUCTIVE SLEEP APNEA): ICD-10-CM

## 2025-04-17 DIAGNOSIS — R73.03 PREDIABETES: ICD-10-CM

## 2025-04-17 RX ORDER — TIRZEPATIDE 2.5 MG/.5ML
2.5 INJECTION, SOLUTION SUBCUTANEOUS
Qty: 2 ML | Refills: 1 | Status: SHIPPED | OUTPATIENT
Start: 2025-04-17

## 2025-04-17 NOTE — ASSESSMENT & PLAN NOTE
Patient diagnosed with LISA 2023. Patient meets moderate-to-severe LISA with a BMI >=30 kg/m^2 to qualify for Zepbound. Sleep Study performed 2/20/2023, Moderate LISA 22.6/hr for RDI4% and severe LISA 35.1/hr for RDI3%. Current regimen includes diet and exercise. Patient's current weight reported as 248 lbs. Due to not being able to lose weight through lifestyle changes alone will start Zepbound. Goal weight 199 lbs.     Medication Changes:  CONTINUE  Metformin 500 mg 1 tablet by mouth with breakfast  START  Zepbound 2.5 mg inject once weekly (plans to start 5/1/2025)    Future Considerations:  Titrate as needed for Zepbound  Change to Metformin XR if GI side effects persist    Monitoring and Education:  Counseled patient on relevant medication mechanisms of action, expectations, side effects, duration of therapy, contraindications, administration, and monitoring parameters.  All questions and concerns addressed. Contact pharmacist with any further questions or concerns prior to next appointment.  Reviewed dietary recommendations: Healthy Plate method  Exercise recommended 150 min per week, 5 days of moderate to high intensity exercise for 30 min.   Patient is aware Zepbound will be out of pocket due to [facility] [place] Drug [place]emption [place]

## 2025-04-17 NOTE — ASSESSMENT & PLAN NOTE
ASSESSMENT: Patient A1c 6.4% as of 4/4/2025, controlled on metformin 500 mg once daily.    CONTINUE:  Metformin 500 mg once daily with food    Monitoring and Education:  See LISA for diet and exercise recommendations

## 2025-04-17 NOTE — PROGRESS NOTES
Clinical Pharmacy Appointment  Subjective     Patient ID: Sheela Davis is a 59 y.o. female who presents for Apnea.    Referring Provider: Nina Clifton DO     4/17 PA was denied, resubmitted, PA denied second time for Drug Exception Determination.  4/10 Patient not able to obtain Zepbound due to plan exclusion. Patient reached out to [facility] to see if she could get coverage for LISA, will submit PA to plan. Discussed alternatives if denied for [employer] vials.    Medication from [employer]  Zepbound  2.5 mg vial $349  Zepbound 5 mg vial $499  $5 for syringe and alcohol swabs    OBSTRUCTIVE SLEEP APNEA:   Has not needed nebulizer or MDI  Patient needs moderate-to-severe LISA with a BMI >=30 kg/m^2 to qualify for Zepbound  Sleep Study performed 2/20/2023Moderate LISA 22.6/hr for RDI4% and severe LISA 35.1/hr for RDI3%     PRE DIABETES MELLITUS TYPE 2:    Does patient follow with Endocrinology: No  Last optometry exam: >1 year ago  Most recent visit in Podiatry: NA     Diet: 2 meals more whole foods and more intentional  LN: veggies peppers, onions, spinach on tortilla, chicken sausage or leftover chili-hamburger, tomatoes, onions, crackers  DN: fast food-fried chicken wings with mac n cheese and cabbage, sometimes leftovers from first meal, veggies and salsa  Snacks: popcorn, sun chips, rice cakes, sweet cravings pie, donut, cookies, or cake   Drinks: water, pepsi zero 1-2/day, jug of lemonade once a month, carbonated water [place]   Dietician seen 4/24/2023 She was advised to have less carbs, whole grains, good fats, and use the healthy plate method.     Exercise:   Trying to do more since seeing results from exam, being more intentional.  sedentary, treadmill once a week, work out videos YouTube-strength training and getting in steps, gym membership-[employer], doesn't know what to do when she gets there  Pain with arthritis in knees, lymphedema in arms and legs, and weight    Allergies   Allergen  Reactions    Clindamycin Hives       Objective     Current DM Pharmacotherapy:   Metformin 500 mg once daily with breakfast    Clarifications to above regimen: sometimes miss dose 2 times a week  Adverse Effects: some GI distress    SECONDARY PREVENTION  - Statin? Yes, rosuvastatin 5 mg daily  - ACEi/ARB? Yes, losartan 50 mg daily  - Aspirin? No    Current monitoring regimen:   Patient is using:  not testing at this time    Testing frequency: NA    SMBG Readings: NA    Any episodes of hypoglycemia? No.  Did patient treat episode of hypoglycemia appropriately? N/A    Pertinent PMH Review:  - PMH of Pancreatitis: No  - PMH/FH of Medullary Thyroid Cancer: No  - PMH/FH of Multiple Endocrine Neoplasia (MEN) Type II: No  - PMH of Retinopathy: No  - PMH of Urinary Tract Infections/Yeast Infections: No    Immunizations:  Influenza? No-last time received 9/21/2009-recommended annually-will be getting  COVID? Yes- has not received 2024 dose-recommended annually-will be getting  Pneumonia? PPSV23 12/18/2021-recommended second dose at 66 yo  Shingles? Yes- 8/4/2022-recommended once after 51 yo  Hepatitis B? No-recommended 3-dose hepatitis B vaccine series (0, 1, and 6 months) for those under 59 yo   Tdap? No- 11/1/2012- recommended booster every 10 years-will be getting  RSV? No- recommended for 60-74 with high risk-not needed at this time pt does have asthma    Lab Review    BMP  Lab Results   Component Value Date    CALCIUM 9.3 04/04/2025     04/04/2025    K 4.4 04/04/2025    CO2 28 04/04/2025     04/04/2025    BUN 10 04/04/2025    CREATININE 0.74 04/04/2025    EGFR 92 04/04/2025     LFTs  Lab Results   Component Value Date    ALT 17 04/04/2025    AST 12 04/04/2025    ALKPHOS 89 04/04/2025    BILITOT 0.3 04/04/2025     FLP  Lab Results   Component Value Date    TRIG 119 04/04/2025    CHOL 140 04/04/2025    LDLF 119 (H) 05/07/2022    LDLCALC 56 04/04/2025    HDL 63 04/04/2025       The 10-year ASCVD risk score  "(Ricco SOUSA, et al., 2019) is: 5.1%    Values used to calculate the score:      Age: 59 years      Sex: Female      Is Non- : Yes      Diabetic: No      Tobacco smoker: No      Systolic Blood Pressure: 130 mmHg      Is BP treated: Yes      HDL Cholesterol: 63 mg/dL      Total Cholesterol: 140 mg/dL  Urine Microalbumin  No results found for: \"MICROALBCREA\"  No results found for: \"MICROALBCREA\"  Weight Management  Wt Readings from Last 3 Encounters:   04/04/25 112 kg (248 lb)   03/07/25 112 kg (248 lb)   04/11/24 107 kg (235 lb)      There is no height or weight on file to calculate BMI.   A1C  Lab Results   Component Value Date    HGBA1C 6.4 (H) 04/04/2025    HGBA1C 5.9 (H) 08/12/2024    HGBA1C 5.7 (H) 04/29/2024         Assessment/Plan     Problem List Items Addressed This Visit       Prediabetes    ASSESSMENT: Patient A1c 6.4% as of 4/4/2025, controlled on metformin 500 mg once daily.    CONTINUE:  Metformin 500 mg once daily with food    Monitoring and Education:  See LISA for diet and exercise recommendations         Relevant Orders    Referral to Clinical Pharmacy    LISA (obstructive sleep apnea)    Patient diagnosed with LISA 2023. Patient meets moderate-to-severe LISA with a BMI >=30 kg/m^2 to qualify for Zepbound. Sleep Study performed 2/20/2023, Moderate LISA 22.6/hr for RDI4% and severe LISA 35.1/hr for RDI3%. Current regimen includes diet and exercise. Patient's current weight reported as 248 lbs. Due to not being able to lose weight through lifestyle changes alone will start Zepbound. Goal weight 199 lbs.     Medication Changes:  CONTINUE  Metformin 500 mg 1 tablet by mouth with breakfast  START  Zepbound 2.5 mg inject once weekly (plans to start 5/1/2025)    Future Considerations:  Titrate as needed for Zepbound  Change to Metformin XR if GI side effects persist    Monitoring and Education:  Counseled patient on relevant medication mechanisms of action, expectations, side effects, " duration of therapy, contraindications, administration, and monitoring parameters.  All questions and concerns addressed. Contact pharmacist with any further questions or concerns prior to next appointment.  Reviewed dietary recommendations: Healthy Plate method  Exercise recommended 150 min per week, 5 days of moderate to high intensity exercise for 30 min.   Patient is aware Zepbound will be out of pocket due to [employer] denial of Drug Exemption Determination         Relevant Medications    tirzepatide, weight loss, (Zepbound) 2.5 mg/0.5 mL solution    Other Relevant Orders    Referral to Clinical Pharmacy     Follow up: I recommend LISA and pre-diabetes care be 4 weeks for Zepbound. 5/22 10am    April Hernandez, PharmD [facility]  Pharmacist Resident      Continue all meds under the continuation of care with the referring provider and clinical pharmacy team

## 2025-04-18 DIAGNOSIS — I10 PRIMARY HYPERTENSION: ICD-10-CM

## 2025-04-18 DIAGNOSIS — R73.03 PREDIABETES: ICD-10-CM

## 2025-04-18 DIAGNOSIS — E78.5 HYPERLIPIDEMIA, UNSPECIFIED HYPERLIPIDEMIA TYPE: ICD-10-CM

## 2025-04-21 RX ORDER — METFORMIN HYDROCHLORIDE 500 MG/1
500 TABLET ORAL
Qty: 90 TABLET | Refills: 1 | Status: SHIPPED | OUTPATIENT
Start: 2025-04-21

## 2025-04-21 RX ORDER — LOSARTAN POTASSIUM 50 MG/1
50 TABLET ORAL DAILY
Qty: 90 TABLET | Refills: 1 | Status: SHIPPED | OUTPATIENT
Start: 2025-04-21

## 2025-04-21 RX ORDER — ROSUVASTATIN CALCIUM 5 MG/1
5 TABLET, COATED ORAL DAILY
Qty: 90 TABLET | Refills: 1 | Status: SHIPPED | OUTPATIENT
Start: 2025-04-21

## 2025-04-21 RX ORDER — AMLODIPINE BESYLATE 5 MG/1
5 TABLET ORAL DAILY
Qty: 90 TABLET | Refills: 1 | Status: SHIPPED | OUTPATIENT
Start: 2025-04-21

## 2025-05-21 NOTE — PROGRESS NOTES
Clinical Pharmacy Appointment  Subjective     Patient ID: Sheela Davis is a 59 y.o. female who presents for Apnea (/).    Referring Provider: Nina Clifton,      5/22 Patient is doing well with Zepbound 2.5 mg, has been tolerating but does not know if it has been helping with LISA.   4/17 PA was denied, resubmitted, PA denied second time for Drug Exception Determination.    Medication from [employer]  Zepbound  2.5 mg vial $349  Zepbound 5 mg vial $499  $5 for syringe and alcohol swabs    OBSTRUCTIVE SLEEP APNEA:   Has not needed nebulizer or MDI  Patient needs moderate-to-severe LISA with a BMI >=30 kg/m^2 to qualify for Zepbound  Sleep Study performed 2/20/2023Moderate LISA 22.6/hr for RDI4% and severe LISA 35.1/hr for RDI3%     PRE DIABETES MELLITUS TYPE 2:    Does patient follow with Endocrinology: No  Last optometry exam: >1 year ago  Most recent visit in Podiatry: NA     Diet: not craving sweets (increases before next injection), likes starch but not wanting much of it (boiled potatoes), medication gives her time to make better choices. Protein- ground beef, chicken, fish, chickpeas. Veggies-fresh and frozen. Fruits-blueberries and blackberries. Fast food once a week. Cooking enough veggies for a week first then adding protein to it (fajita strip chicken). 100 g of protein  2 meals more whole foods and more intentional  LN: veggies peppers, onions, spinach on tortilla, chicken sausage or leftover chili-hamburger, tomatoes, onions, crackers  DN: fast food-fried chicken wings with mac n cheese and cabbage, sometimes leftovers from first meal, veggies and salsa  Snacks: popcorn, sun chips, rice cakes, sweet cravings pie, donut, cookies, or cake   Drinks: water, pepsi zero 1-2/day, jug of lemonade once a month, carbonated water [place]   Dietician seen 4/24/2023 She was advised to have less carbs, whole grains, good fats, and use the healthy plate method.     Exercise: still needing to work on, goal is to  start lifting weights and walking  Trying to do more since seeing results from exam, being more intentional.  sedentary, treadmill once a week, work out videos YouTube-strength training and getting in steps, gym membership-[employer], doesn't know what to do when she gets there  Pain with arthritis in knees, lipoedema in arms and legs, and weight    Allergies   Allergen Reactions    Clindamycin Hives       Objective     Current DM Pharmacotherapy:   Metformin 500 mg once daily with breakfast  Zepbound 2.5 mg inject once weekly (plans to start 5/1/2025)    Clarifications to above regimen: stopped taking metformin but will start taking again after A1c is checked and there is no improvement  Adverse Effects: some diarrhea but knows what causes it food wise    SECONDARY PREVENTION  - Statin? Yes, rosuvastatin 5 mg daily  - ACEi/ARB? Yes, losartan 50 mg daily  - Aspirin? No    Current monitoring regimen:   Patient is using: not testing at this time    Testing frequency: NA    SMBG Readings: NA    Any episodes of hypoglycemia? No.  Did patient treat episode of hypoglycemia appropriately? N/A    Pertinent PMH Review:  - PMH of Pancreatitis: No  - PMH/FH of Medullary Thyroid Cancer: No  - PMH/FH of Multiple Endocrine Neoplasia (MEN) Type II: No  - PMH of Retinopathy: No  - PMH of Urinary Tract Infections/Yeast Infections: No    Immunizations:  Influenza? No-last time received 9/21/2009-recommended annually-will be getting  COVID? Yes- has not received 2024 dose-recommended annually-will be getting  Pneumonia? PPSV23 12/18/2021-recommended second dose at 66 yo  Shingles? Yes- 8/4/2022-recommended once after 51 yo  Hepatitis B? No-recommended 3-dose hepatitis B vaccine series (0, 1, and 6 months) for those under 59 yo   Tdap? No- 11/1/2012- recommended booster every 10 years-will be getting  RSV? No- recommended for 60-74 with high risk-not needed at this time pt does have asthma    Lab Review    BMP  Lab Results  "  Component Value Date    CALCIUM 9.3 04/04/2025     04/04/2025    K 4.4 04/04/2025    CO2 28 04/04/2025     04/04/2025    BUN 10 04/04/2025    CREATININE 0.74 04/04/2025    EGFR 92 04/04/2025     LFTs  Lab Results   Component Value Date    ALT 17 04/04/2025    AST 12 04/04/2025    ALKPHOS 89 04/04/2025    BILITOT 0.3 04/04/2025     FLP  Lab Results   Component Value Date    TRIG 119 04/04/2025    CHOL 140 04/04/2025    LDLF 119 (H) 05/07/2022    LDLCALC 56 04/04/2025    HDL 63 04/04/2025       The 10-year ASCVD risk score (Ricco SOUSA, et al., 2019) is: 5.1%    Values used to calculate the score:      Age: 59 years      Sex: Female      Is Non- : Yes      Diabetic: No      Tobacco smoker: No      Systolic Blood Pressure: 130 mmHg      Is BP treated: Yes      HDL Cholesterol: 63 mg/dL      Total Cholesterol: 140 mg/dL  Urine Microalbumin  No results found for: \"MICROALBCREA\"  No results found for: \"MICROALBCREA\"  Weight Management  Wt Readings from Last 3 Encounters:   04/04/25 112 kg (248 lb)   03/07/25 112 kg (248 lb)   04/11/24 107 kg (235 lb)      There is no height or weight on file to calculate BMI.   A1C  Lab Results   Component Value Date    HGBA1C 6.4 (H) 04/04/2025    HGBA1C 5.9 (H) 08/12/2024    HGBA1C 5.7 (H) 04/29/2024     Assessment/Plan     Problem List Items Addressed This Visit       Prediabetes    ASSESSMENT: Patient A1c 6.4% as of 4/4/2025, controlled diabetes. Patient is not wanting to take metformin at this time due to starting Zepbound. Patient wants to get A1c done in July and see if metformin is needed.    STOP  Metformin    Monitoring and Education:  See LISA for diet and exercise recommendations         Relevant Orders    Referral to Clinical Pharmacy    LISA (obstructive sleep apnea)    Patient diagnosed with LISA 2023. Patient meets moderate-to-severe LISA with a BMI >=30 kg/m^2 to qualify for Zepbound. Sleep Study performed 2/20/2023, Moderate LISA 22.6/hr " for RDI4% and severe LISA 35.1/hr for RDI3%. Current regimen includes diet and Zepbound. Patient's current weight 242-243 lbs (5-6 lbs loss) decreased from 248 lbs. Goal weight 199 lbs.  Patient reports one dose left for Monday 5/26. Patient denies LISA of nausea, vomiting, constipation. Patient endorses diarrhea with certain foods and knows how to manage. Used shared decision making to increase Zepbound to 5 mg. Patient diet has improved significantly since last appointment and will start exercise with increase in dose.    Medication Changes:  STOP  Metformin 500 mg 1 tablet by mouth with breakfast  INCREASE  Zepbound 5 mg inject once weekly (Monday morning)    Future Considerations:  Titrate as needed for Zepbound    Monitoring and Education:  Counseled patient on relevant medication mechanisms of action, expectations, side effects, duration of therapy, contraindications, administration, and monitoring parameters.  All questions and concerns addressed. Contact pharmacist with any further questions or concerns prior to next appointment.  Reviewed dietary recommendations: Healthy Plate method  Exercise recommended 150 min per week, 5 days of moderate to high intensity exercise for 30 min.   Patient is aware Zepbound will be out of pocket due to [facility] of Drug Exemption Determination         Relevant Medications    tirzepatide, weight loss, (Zepbound) 5 mg/0.5 mL injection    Other Relevant Orders    Referral to Clinical Pharmacy     Follow up: I recommend LISA and pre-diabetes care be 4 weeks for Zepbound. 6/19 10am    April Hernandez, PharmD [facility]  Pharmacist Resident    Continue all meds under the continuation of care with the referring provider and clinical pharmacy team   General

## 2025-05-22 ENCOUNTER — APPOINTMENT (OUTPATIENT)
Dept: PHARMACY | Facility: HOSPITAL | Age: 60
End: 2025-05-22
Payer: COMMERCIAL

## 2025-05-22 DIAGNOSIS — R73.03 PREDIABETES: ICD-10-CM

## 2025-05-22 DIAGNOSIS — G47.33 OSA (OBSTRUCTIVE SLEEP APNEA): ICD-10-CM

## 2025-05-22 NOTE — ASSESSMENT & PLAN NOTE
Patient diagnosed with LISA 2023. Patient meets moderate-to-severe LISA with a BMI >=30 kg/m^2 to qualify for Zepbound. Sleep Study performed 2/20/2023, Moderate LISA 22.6/hr for RDI4% and severe LISA 35.1/hr for RDI3%. Current regimen includes diet and Zepbound. Patient's current weight 242-243 lbs (5-6 lbs loss) decreased from 248 lbs. Goal weight 199 lbs.  Patient reports one dose left for Monday 5/26. Patient denies LISA of nausea, vomiting, constipation. Patient endorses diarrhea with certain foods and knows how to manage. Used shared decision making to increase Zepbound to 5 mg. Patient diet has improved significantly since last appointment and will start exercise with increase in dose.    Medication Changes:  STOP  Metformin 500 mg 1 tablet by mouth with breakfast  INCREASE  Zepbound 5 mg inject once weekly (Monday morning)    Future Considerations:  Titrate as needed for Zepbound    Monitoring and Education:  Counseled patient on relevant medication mechanisms of action, expectations, side effects, duration of therapy, contraindications, administration, and monitoring parameters.  All questions and concerns addressed. Contact pharmacist with any further questions or concerns prior to next appointment.  Reviewed dietary recommendations: Healthy Plate method  Exercise recommended 150 min per week, 5 days of moderate to high intensity exercise for 30 min.   Patient is aware Zepbound will be out of pocket due to [employer] denial of Drug Exemption Determination

## 2025-05-22 NOTE — ASSESSMENT & PLAN NOTE
ASSESSMENT: Patient A1c 6.4% as of 4/4/2025, controlled diabetes. Patient is not wanting to take metformin at this time due to starting Zepbound. Patient wants to get A1c done in July and see if metformin is needed.    STOP  Metformin    Monitoring and Education:  See LISA for diet and exercise recommendations

## 2025-05-28 DIAGNOSIS — G47.33 OSA (OBSTRUCTIVE SLEEP APNEA): Primary | ICD-10-CM

## 2025-05-28 NOTE — PROGRESS NOTES
I reviewed the progress note and agree with the resident’s findings and plans as written. Case discussed with resident.    Pinky Maurer, MyleneD

## 2025-05-28 NOTE — PROGRESS NOTES
Patient called to get solution for [employer].    April Hernandez, PharmD [facility]  Pharmacist Resident

## 2025-06-18 NOTE — PROGRESS NOTES
Clinical Pharmacy Appointment  Subjective     Patient ID: Sheela Davis is a 59 y.o. female who presents for Sleep Apnea and pre-diabetes.    Referring Provider: Nina Clifton,      6/19 No longer nausea, counts calories, drinks water most of the time, having protein. Not on a calorie deficit, lost 1.5 lbs   5/22 Patient is doing well with Zepbound 2.5 mg, has been tolerating but does not know if it has been helping with LISA.     Medication from [employer]  Zepbound 7.5 mg vial $499  $5 for syringe and alcohol swabs    OBSTRUCTIVE SLEEP APNEA:   Has not needed nebulizer or MDI  Patient needs moderate-to-severe LISA with a BMI >=30 kg/m^2 to qualify for Zepbound  Sleep Study performed 2/20/2023Moderate LISA 22.6/hr for RDI4% and severe LISA 35.1/hr for RDI3%     PRE DIABETES MELLITUS TYPE 2:    Does patient follow with Endocrinology: No  Last optometry exam: >1 year ago  Most recent visit in Podiatry: NA     Diet: 6/19 no more sweets  not craving sweets (increases before next injection), likes starch but not wanting much of it (boiled potatoes), medication gives her time to make better choices. Protein- ground beef, chicken, fish, chickpeas. Veggies-fresh and frozen. Fruits-blueberries and blackberries. Fast food once a week. Cooking enough veggies for a week first then adding protein to it (fajita strip chicken). 100 g of protein  2 meals more whole foods and more intentional  LN: veggies peppers, onions, spinach on tortilla, chicken sausage or leftover chili-hamburger, tomatoes, onions, crackers  DN: fast food-fried chicken wings with mac n cheese and cabbage, sometimes leftovers from first meal, veggies and salsa  Snacks: popcorn, sun chips, rice cakes, sweet cravings pie, donut, cookies, or cake   Drinks: water, pepsi zero 1-2/day, jug of lemonade once a month, carbonated water [place]   Dietician seen 4/24/2023 She was advised to have less carbs, whole grains, good fats, and use the healthy plate method.      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  sedentary, treadmill once a week, work out videos YouTube-strength training and getting in steps, gym membership-[employer], doesn't know what to do when she gets there  Pain with arthritis in knees, lipoedema in arms and legs, and weight    Allergies   Allergen Reactions    Clindamycin Hives       Objective     Current DM Pharmacotherapy:   Zepbound 5 mg inject once weekly     Clarifications to above regimen: none  Adverse Effects: some nausea with first dose but nothing since then    SECONDARY PREVENTION  - Statin? Yes, rosuvastatin 5 mg daily  - ACEi/ARB? Yes, losartan 50 mg daily  - Aspirin? No    Current monitoring regimen:   Patient is using: not testing at this time    Testing frequency: NA    SMBG Readings: NA    Any episodes of hypoglycemia? No.  Did patient treat episode of hypoglycemia appropriately? N/A    Pertinent PMH Review:  - PMH of Pancreatitis: No  - PMH/FH of Medullary Thyroid Cancer: No  - PMH/FH of Multiple Endocrine Neoplasia (MEN) Type II: No  - PMH of Retinopathy: No  - PMH of Urinary Tract Infections/Yeast Infections: No    Immunizations:  Influenza? No-last time received 9/21/2009-recommended annually-will be getting  COVID? Yes- has not received 2024 dose-recommended annually-will be getting  Pneumonia? PPSV23 12/18/2021-recommended second dose at 64 yo  Shingles? Yes- 8/4/2022-recommended once after 49 yo  Hepatitis B? No-recommended 3-dose hepatitis B vaccine series (0, 1, and 6 months) for those under 61 yo   Tdap? No- 11/1/2012- recommended booster every 10 years-will be getting  RSV? No- recommended for 60-74 with high risk-not needed at this time pt does have asthma    Lab Review    BMP  Lab Results   Component Value Date    CALCIUM 9.3 04/04/2025     04/04/2025    K 4.4 04/04/2025    CO2 28 04/04/2025      "04/04/2025    BUN 10 04/04/2025    CREATININE 0.74 04/04/2025    EGFR 92 04/04/2025     LFTs  Lab Results   Component Value Date    ALT 17 04/04/2025    AST 12 04/04/2025    ALKPHOS 89 04/04/2025    BILITOT 0.3 04/04/2025     FLP  Lab Results   Component Value Date    TRIG 119 04/04/2025    CHOL 140 04/04/2025    LDLF 119 (H) 05/07/2022    LDLCALC 56 04/04/2025    HDL 63 04/04/2025       The 10-year ASCVD risk score (Ricco SOUSA, et al., 2019) is: 5.1%    Values used to calculate the score:      Age: 59 years      Sex: Female      Is Non- : Yes      Diabetic: No      Tobacco smoker: No      Systolic Blood Pressure: 130 mmHg      Is BP treated: Yes      HDL Cholesterol: 63 mg/dL      Total Cholesterol: 140 mg/dL  Urine Microalbumin  No results found for: \"MICROALBCREA\"  No results found for: \"MICROALBCREA\"  Weight Management  Wt Readings from Last 3 Encounters:   04/04/25 112 kg (248 lb)   03/07/25 112 kg (248 lb)   04/11/24 107 kg (235 lb)      There is no height or weight on file to calculate BMI.   A1C  Lab Results   Component Value Date    HGBA1C 6.4 (H) 04/04/2025    HGBA1C 5.9 (H) 08/12/2024    HGBA1C 5.7 (H) 04/29/2024     Assessment/Plan     Problem List Items Addressed This Visit       Prediabetes    ASSESSMENT: Patient A1c 6.4% as of 4/4/2025, controlled diabetes. Patient is not wanting to take metformin at this time due to starting Zepbound.     Monitoring and Education:  See LISA for diet and exercise recommendations  A1c labs to be done every 6 months-10/4/2025         LISA (obstructive sleep apnea)    Patient diagnosed with LISA 2023. Patient meets moderate-to-severe LISA with a BMI >=30 kg/m^2 to qualify for Zepbound. Sleep Study performed 2/20/2023, Moderate LISA 22.6/hr for RDI4% and severe LISA 35.1/hr for RDI3%.   Current regimen includes diet and Zepbound. Patient to incorporate exercise to aid in weight loss. Patient's current weight 239 lbs (9 lbs loss) decreased from starting " weight of 248 lbs. Goal weight 199 lbs. Patient denies LISA of nausea, vomiting, constipation. Patient concerned that she has not been losing weight, used shared decision making to increase Zepbound to 7.5 mg.     Medication Changes:  INCREASE  Zepbound 7.5 mg inject once weekly (Monday morning)    Future Considerations:  Titrate as needed for Zepbound    Monitoring and Education:  Counseled patient on relevant medication mechanisms of action, expectations, side effects, duration of therapy, contraindications, administration, and monitoring parameters.  All questions and concerns addressed. Contact pharmacist with any further questions or concerns prior to next appointment.  Reviewed dietary recommendations: Healthy Plate method  Exercise recommended 150 min per week, 5 days of moderate to high intensity exercise for 30 min.           Follow up: I recommend LISA and pre-diabetes care be 4 weeks for Zepbound. 7/21 11am    April Hernandez, PharmD [facility]  Pharmacist Resident    Continue all meds under the continuation of care with the referring provider and clinical pharmacy team

## 2025-06-19 ENCOUNTER — APPOINTMENT (OUTPATIENT)
Dept: PHARMACY | Facility: HOSPITAL | Age: 60
End: 2025-06-19
Payer: COMMERCIAL

## 2025-06-19 DIAGNOSIS — R73.03 PREDIABETES: ICD-10-CM

## 2025-06-19 DIAGNOSIS — G47.33 OSA (OBSTRUCTIVE SLEEP APNEA): ICD-10-CM

## 2025-06-19 NOTE — ASSESSMENT & PLAN NOTE
ASSESSMENT: Patient A1c 6.4% as of 4/4/2025, controlled diabetes. Patient is not wanting to take metformin at this time due to starting Zepbound.     Monitoring and Education:  See LISA for diet and exercise recommendations  A1c labs to be done every 6 months-10/4/2025

## 2025-06-19 NOTE — ASSESSMENT & PLAN NOTE
Patient diagnosed with LISA 2023. Patient meets moderate-to-severe LISA with a BMI >=30 kg/m^2 to qualify for Zepbound. Sleep Study performed 2/20/2023, Moderate LISA 22.6/hr for RDI4% and severe LISA 35.1/hr for RDI3%.   Current regimen includes diet and Zepbound. Patient to incorporate exercise to aid in weight loss. Patient's current weight 239 lbs (9 lbs loss) decreased from starting weight of 248 lbs. Goal weight 199 lbs. Patient denies LISA of nausea, vomiting, constipation. Patient concerned that she has not been losing weight, used shared decision making to increase Zepbound to 7.5 mg.     Medication Changes:  INCREASE  Zepbound 7.5 mg inject once weekly (Monday morning)    Future Considerations:  Titrate as needed for Zepbound    Monitoring and Education:  Counseled patient on relevant medication mechanisms of action, expectations, side effects, duration of therapy, contraindications, administration, and monitoring parameters.  All questions and concerns addressed. Contact pharmacist with any further questions or concerns prior to next appointment.  Reviewed dietary recommendations: Healthy Plate method  Exercise recommended 150 min per week, 5 days of moderate to high intensity exercise for 30 min.

## 2025-07-21 ENCOUNTER — APPOINTMENT (OUTPATIENT)
Dept: PHARMACY | Facility: HOSPITAL | Age: 60
End: 2025-07-21
Payer: COMMERCIAL

## 2025-07-21 DIAGNOSIS — G47.33 OSA (OBSTRUCTIVE SLEEP APNEA): Primary | ICD-10-CM

## 2025-07-21 NOTE — ASSESSMENT & PLAN NOTE
Patient diagnosed with LISA 2023. Patient meets moderate-to-severe LISA with a BMI >=30 kg/m^2 to qualify for Zepbound. Sleep Study performed 2/20/2023, Moderate LISA 22.6/hr for RDI4% and severe LISA 35.1/hr for RDI3%.   Current regimen includes diet and Zepbound. Patient to incorporate exercise to aid in weight loss. Patient's current weight 233 lbs (15 lbs loss) decreased from starting weight of 248 lbs. Pt goal weight 199 lbs. Patient denies LISA of nausea, vomiting, constipation.     Patient will be missing 1 week of Zepbound due to not being paid until 8/1. Will restart at same dose given there will be a gap.      Medication Changes:  CONTINUE  Zepbound 7.5 mg inject once weekly (Monday morning)     Future Considerations:  Titrate as needed for Zepbound     Monitoring and Education:  Counseled patient on relevant medication mechanisms of action, expectations, side effects, duration of therapy, contraindications, administration, and monitoring parameters.  All questions and concerns addressed. Contact pharmacist with any further questions or concerns prior to next appointment.  Reviewed dietary recommendations: Healthy Plate method  Exercise recommended 150 min per week, 5 days of moderate to high intensity exercise for 30 min.

## 2025-07-21 NOTE — PROGRESS NOTES
Clinical Pharmacy Appointment  Subjective     Patient ID: Sheela Davis is a 59 y.o. female who presents for Prediabetes and Sleep Apnea.    Referring Provider: Nina Clifton,      7/21: no issues going up on Zepbound     Medication from [facility]  Zepbound 7.5 mg vial $499  $5 for syringe and alcohol swabs    OBSTRUCTIVE SLEEP APNEA:   Has not needed nebulizer or MDI  Patient needs moderate-to-severe LISA with a BMI >=30 kg/m^2 to qualify for Zepbound  Sleep Study performed 2/20/2023Moderate LISA 22.6/hr for RDI4% and severe LISA 35.1/hr for RDI3%     PRE DIABETES MELLITUS TYPE 2:    Does patient follow with Endocrinology: No  Last optometry exam: >1 year ago  Most recent visit in Podiatry: NA     Diet:   7/21:   Dietician seen 4/24/2023 She was advised to have less carbs, whole grains, good fats, and use the healthy plate method.     Exercise:   7/21: a little bit better- walking more ~2 days per week (goal 5 days per week)  Also watching [employer] videos for chair weight exercises       Allergies   Allergen Reactions    Clindamycin Hives       Objective     Current DM Pharmacotherapy:   Zepbound 7.5 mg/0.5 mL -  inject once weekly     Clarifications to above regimen: None  Adverse Effects: None    SECONDARY PREVENTION  - Statin? Yes, rosuvastatin 5 mg daily  - ACEi/ARB? Yes, losartan 50 mg daily  - Aspirin? No    Current monitoring regimen:   Patient is using: not testing at this time    Testing frequency: NA    SMBG Readings: NA    Any episodes of hypoglycemia? No.  Did patient treat episode of hypoglycemia appropriately? N/A    Pertinent PMH Review:  - PMH of Pancreatitis: No  - PMH/FH of Medullary Thyroid Cancer: No  - PMH/FH of Multiple Endocrine Neoplasia (MEN) Type II: No  - PMH of Retinopathy: No  - PMH of Urinary Tract Infections/Yeast Infections: No    Immunizations:  Influenza? No-last time received 9/21/2009-recommended annually-will be getting  COVID? Yes- has not received 2024 dose-recommended  "annually-will be getting  Pneumonia? PPSV23 12/18/2021-recommended second dose at 66 yo  Shingles? Yes- 8/4/2022-recommended once after 49 yo  Hepatitis B? No-recommended 3-dose hepatitis B vaccine series (0, 1, and 6 months) for those under 59 yo   Tdap? No- 11/1/2012- recommended booster every 10 years-will be getting  RSV? No- recommended for 60-74 with high risk-not needed at this time pt does have asthma    Lab Review    BMP  Lab Results   Component Value Date    CALCIUM 9.3 04/04/2025     04/04/2025    K 4.4 04/04/2025    CO2 28 04/04/2025     04/04/2025    BUN 10 04/04/2025    CREATININE 0.74 04/04/2025    EGFR 92 04/04/2025     LFTs  Lab Results   Component Value Date    ALT 17 04/04/2025    AST 12 04/04/2025    ALKPHOS 89 04/04/2025    BILITOT 0.3 04/04/2025     FLP  Lab Results   Component Value Date    TRIG 119 04/04/2025    CHOL 140 04/04/2025    LDLF 119 (H) 05/07/2022    LDLCALC 56 04/04/2025    HDL 63 04/04/2025       The 10-year ASCVD risk score (Ricco SOUSA, et al., 2019) is: 5.1%    Values used to calculate the score:      Age: 59 years      Clincally relevant sex: Female      Is Non- : Yes      Diabetic: No      Tobacco smoker: No      Systolic Blood Pressure: 130 mmHg      Is BP treated: Yes      HDL Cholesterol: 63 mg/dL      Total Cholesterol: 140 mg/dL  Urine Microalbumin  No results found for: \"MICROALBCREA\"  No results found for: \"MICROALBCREA\"  Weight Management  Wt Readings from Last 3 Encounters:   04/04/25 112 kg (248 lb)   03/07/25 112 kg (248 lb)   04/11/24 107 kg (235 lb)      7/21/25: 233 lbs (home weight)     There is no height or weight on file to calculate BMI.   A1C  Lab Results   Component Value Date    HGBA1C 6.4 (H) 04/04/2025    HGBA1C 5.9 (H) 08/12/2024    HGBA1C 5.7 (H) 04/29/2024     Assessment/Plan     Problem List Items Addressed This Visit       LISA (obstructive sleep apnea) - Primary    Patient diagnosed with LISA 2023. Patient meets " moderate-to-severe LISA with a BMI >=30 kg/m^2 to qualify for Zepbound. Sleep Study performed 2/20/2023, Moderate LISA 22.6/hr for RDI4% and severe LISA 35.1/hr for RDI3%.   Current regimen includes diet and Zepbound. Patient to incorporate exercise to aid in weight loss. Patient's current weight 233 lbs (15 lbs loss) decreased from starting weight of 248 lbs. Pt goal weight 199 lbs. Patient denies LISA of nausea, vomiting, constipation.     Patient will be missing 1 week of Zepbound due to not being paid until 8/1. Will restart at same dose given there will be a gap.      Medication Changes:  CONTINUE  Zepbound 7.5 mg inject once weekly (Monday morning)     Future Considerations:  Titrate as needed for Zepbound     Monitoring and Education:  Counseled patient on relevant medication mechanisms of action, expectations, side effects, duration of therapy, contraindications, administration, and monitoring parameters.  All questions and concerns addressed. Contact pharmacist with any further questions or concerns prior to next appointment.  Reviewed dietary recommendations: Healthy Plate method  Exercise recommended 150 min per week, 5 days of moderate to high intensity exercise for 30 min.             Follow up: I recommend LISA and pre-diabetes care be 4 weeks   Patient is aware of PCP intermediate - will follow with Dr. Fer Maurer PharmD, BCACP  Clinical Pharmacy Specialist, Primary Care     Continue all meds under the continuation of care with the referring provider and clinical pharmacy team

## 2025-08-06 ENCOUNTER — PATIENT MESSAGE (OUTPATIENT)
Dept: PRIMARY CARE | Facility: CLINIC | Age: 60
End: 2025-08-06
Payer: COMMERCIAL

## 2025-08-06 DIAGNOSIS — R73.03 PREDIABETES: Primary | ICD-10-CM

## 2025-08-11 RX ORDER — METFORMIN HYDROCHLORIDE 500 MG/1
500 TABLET, EXTENDED RELEASE ORAL DAILY
Qty: 100 TABLET | Refills: 3 | Status: SHIPPED | OUTPATIENT
Start: 2025-08-11 | End: 2026-09-15

## 2025-08-18 ENCOUNTER — APPOINTMENT (OUTPATIENT)
Dept: PHARMACY | Facility: HOSPITAL | Age: 60
End: 2025-08-18
Payer: COMMERCIAL

## 2025-08-18 DIAGNOSIS — G47.33 OSA (OBSTRUCTIVE SLEEP APNEA): ICD-10-CM

## 2025-08-20 ENCOUNTER — APPOINTMENT (OUTPATIENT)
Dept: PRIMARY CARE | Facility: CLINIC | Age: 60
End: 2025-08-20
Payer: COMMERCIAL

## 2025-09-15 ENCOUNTER — APPOINTMENT (OUTPATIENT)
Dept: PHARMACY | Facility: HOSPITAL | Age: 60
End: 2025-09-15
Payer: COMMERCIAL

## 2026-01-05 ENCOUNTER — APPOINTMENT (OUTPATIENT)
Dept: PRIMARY CARE | Facility: CLINIC | Age: 61
End: 2026-01-05
Payer: MEDICARE